# Patient Record
Sex: FEMALE | Race: BLACK OR AFRICAN AMERICAN | NOT HISPANIC OR LATINO | Employment: UNEMPLOYED | URBAN - METROPOLITAN AREA
[De-identification: names, ages, dates, MRNs, and addresses within clinical notes are randomized per-mention and may not be internally consistent; named-entity substitution may affect disease eponyms.]

---

## 2019-10-07 PROBLEM — R30.0 DYSURIA: Status: ACTIVE | Noted: 2019-10-07

## 2021-07-15 PROBLEM — R30.0 DYSURIA: Status: RESOLVED | Noted: 2019-10-07 | Resolved: 2021-07-15

## 2022-01-14 DIAGNOSIS — U07.1 COVID-19 VIRUS DETECTED: ICD-10-CM

## 2022-08-30 ENCOUNTER — TELEPHONE (OUTPATIENT)
Dept: ORTHOPEDICS | Facility: CLINIC | Age: 37
End: 2022-08-30
Payer: MEDICAID

## 2022-08-30 DIAGNOSIS — M25.562 LEFT KNEE PAIN, UNSPECIFIED CHRONICITY: Primary | ICD-10-CM

## 2022-08-30 DIAGNOSIS — M25.562 PAIN IN BOTH KNEES, UNSPECIFIED CHRONICITY: Primary | ICD-10-CM

## 2022-08-30 DIAGNOSIS — M25.561 PAIN IN BOTH KNEES, UNSPECIFIED CHRONICITY: Primary | ICD-10-CM

## 2022-08-30 NOTE — TELEPHONE ENCOUNTER
LVM to let patient know that xray order would be changed to add both knees and appointment note modified to be seen for both knees.  ----- Message from Helga Cortés sent at 8/30/2022  3:01 PM CDT -----  Type:  Patient Returning Call    Who Called:pt  Who Left Message for Patient:Jie  Does the patient know what this is regarding?:same problem on both of her knees  Would the patient rather a call back or a response via [facility]? call  Best Call Back Number:[phone]  Additional Information: .

## 2022-09-02 ENCOUNTER — OFFICE VISIT (OUTPATIENT)
Dept: ORTHOPEDICS | Facility: CLINIC | Age: 37
End: 2022-09-02
Payer: MEDICAID

## 2022-09-02 ENCOUNTER — HOSPITAL ENCOUNTER (OUTPATIENT)
Dept: RADIOLOGY | Facility: HOSPITAL | Age: 37
Discharge: HOME | End: 2022-09-02
Attending: STUDENT IN AN ORGANIZED HEALTH CARE EDUCATION/TRAINING PROGRAM
Payer: MEDICAID

## 2022-09-02 ENCOUNTER — TELEPHONE (OUTPATIENT)
Dept: ORTHOPEDICS | Facility: CLINIC | Age: 37
End: 2022-09-02

## 2022-09-02 VITALS — WEIGHT: 212 LBS | HEIGHT: 64 IN | BODY MASS INDEX: 36.19 KG/M2

## 2022-09-02 DIAGNOSIS — M25.562 PAIN IN BOTH KNEES, UNSPECIFIED CHRONICITY: ICD-10-CM

## 2022-09-02 DIAGNOSIS — M25.561 PAIN IN BOTH KNEES, UNSPECIFIED CHRONICITY: ICD-10-CM

## 2022-09-02 DIAGNOSIS — M22.2X1 PATELLOFEMORAL PAIN SYNDROME OF BOTH KNEES: Primary | ICD-10-CM

## 2022-09-02 DIAGNOSIS — M22.2X2 PATELLOFEMORAL PAIN SYNDROME OF BOTH KNEES: Primary | ICD-10-CM

## 2022-09-02 DIAGNOSIS — M79.7 FIBROMYALGIA: ICD-10-CM

## 2022-09-02 DIAGNOSIS — M25.562 ACUTE PAIN OF LEFT KNEE: ICD-10-CM

## 2022-09-02 PROCEDURE — 73564 X-RAY EXAM KNEE 4 OR MORE: CPT | Mod: 26,LT,, | Performed by: RADIOLOGY

## 2022-09-02 PROCEDURE — 73564 X-RAY EXAM KNEE 4 OR MORE: CPT | Mod: 26,RT,, | Performed by: RADIOLOGY

## 2022-09-02 PROCEDURE — 3008F PR BODY MASS INDEX (BMI) DOCUMENTED: ICD-10-PCS | Mod: CPTII,,, | Performed by: STUDENT IN AN ORGANIZED HEALTH CARE EDUCATION/TRAINING PROGRAM

## 2022-09-02 PROCEDURE — 99215 OFFICE O/P EST HI 40 MIN: CPT | Mod: PBBFAC | Performed by: STUDENT IN AN ORGANIZED HEALTH CARE EDUCATION/TRAINING PROGRAM

## 2022-09-02 PROCEDURE — 73564 PR  X-RAY KNEE 4+ VIEW: ICD-10-PCS | Mod: 26,LT,, | Performed by: RADIOLOGY

## 2022-09-02 PROCEDURE — 97110 THERAPEUTIC EXERCISES: CPT | Mod: PBBFAC

## 2022-09-02 PROCEDURE — 1160F RVW MEDS BY RX/DR IN RCRD: CPT | Mod: CPTII,,, | Performed by: STUDENT IN AN ORGANIZED HEALTH CARE EDUCATION/TRAINING PROGRAM

## 2022-09-02 PROCEDURE — 99203 PR OFFICE/OUTPT VISIT, NEW, LEVL III, 30-44 MIN: ICD-10-PCS | Mod: S$PBB,,, | Performed by: STUDENT IN AN ORGANIZED HEALTH CARE EDUCATION/TRAINING PROGRAM

## 2022-09-02 PROCEDURE — 1159F MED LIST DOCD IN RCRD: CPT | Mod: CPTII,,, | Performed by: STUDENT IN AN ORGANIZED HEALTH CARE EDUCATION/TRAINING PROGRAM

## 2022-09-02 PROCEDURE — 99999 PR PBB SHADOW E&M-EST. PATIENT-LVL V: CPT | Mod: PBBFAC,,, | Performed by: STUDENT IN AN ORGANIZED HEALTH CARE EDUCATION/TRAINING PROGRAM

## 2022-09-02 PROCEDURE — 99203 OFFICE O/P NEW LOW 30 MIN: CPT | Mod: S$PBB,,, | Performed by: STUDENT IN AN ORGANIZED HEALTH CARE EDUCATION/TRAINING PROGRAM

## 2022-09-02 PROCEDURE — 97110 THERAPEUTIC EXERCISES: CPT | Mod: S$PBB,GP,, | Performed by: STUDENT IN AN ORGANIZED HEALTH CARE EDUCATION/TRAINING PROGRAM

## 2022-09-02 PROCEDURE — 73564 X-RAY EXAM KNEE 4 OR MORE: CPT | Mod: TC,50

## 2022-09-02 PROCEDURE — 97110 PR THERAPEUTIC EXERCISES: ICD-10-PCS | Mod: S$PBB,GP,, | Performed by: STUDENT IN AN ORGANIZED HEALTH CARE EDUCATION/TRAINING PROGRAM

## 2022-09-02 PROCEDURE — 1160F PR REVIEW ALL MEDS BY PRESCRIBER/CLIN PHARMACIST DOCUMENTED: ICD-10-PCS | Mod: CPTII,,, | Performed by: STUDENT IN AN ORGANIZED HEALTH CARE EDUCATION/TRAINING PROGRAM

## 2022-09-02 PROCEDURE — 3008F BODY MASS INDEX DOCD: CPT | Mod: CPTII,,, | Performed by: STUDENT IN AN ORGANIZED HEALTH CARE EDUCATION/TRAINING PROGRAM

## 2022-09-02 PROCEDURE — 1159F PR MEDICATION LIST DOCUMENTED IN MEDICAL RECORD: ICD-10-PCS | Mod: CPTII,,, | Performed by: STUDENT IN AN ORGANIZED HEALTH CARE EDUCATION/TRAINING PROGRAM

## 2022-09-02 PROCEDURE — 99999 PR PBB SHADOW E&M-EST. PATIENT-LVL V: ICD-10-PCS | Mod: PBBFAC,,, | Performed by: STUDENT IN AN ORGANIZED HEALTH CARE EDUCATION/TRAINING PROGRAM

## 2022-09-02 RX ORDER — METHYLPREDNISOLONE 4 MG/1
TABLET ORAL
Qty: 21 EACH | Refills: 0 | Status: SHIPPED | OUTPATIENT
Start: 2022-09-02 | End: 2022-11-18

## 2022-09-02 NOTE — TELEPHONE ENCOUNTER
Patient wanted to check to make sure that follow up was scheduled for the correct amount of time between visits.  Checked doctor's note and patient is to follow up in 8 weeks so appointment is scheduled correctly.  Patient verbalized understanding.   ----- Message from Talia Garcia sent at 9/2/2022  2:17 PM CDT -----  Contact: pt  The pt request a call concerning her 10/28 appt, can be reached at [phone]//thxMW

## 2022-09-02 NOTE — PATIENT INSTRUCTIONS
Assessment:  Mali Mckinley is a 37 y.o. female   Chief Complaint   Patient presents with    Left Knee - Pain    Right Knee - Pain       Encounter Diagnosis   Name Primary?    Acute pain of left knee         Plan:  Ordered Medrol Dose pack  Apply topical diclofenac (Voltaren) up to 4 times a day to the affected area.  It can be bought over the counter at any local pharmacy.                  Follow-up:  8 weeks or sooner if there are any problems between now and then.    Thank you for choosing [facility] and Dr. Higinio Lagos for your orthopedic & sports medicine care. It is our goal to provide you with exceptional care that will help keep you healthy, active, and get you back in the game.    Please do not hesitate to reach out to us via email, phone, or MyChart with any questions, concerns, or feedback.    If you felt that you received exemplary care today, please consider leaving us feedback on [employer]ettes at:  [email or web address]    If you are experiencing pain/discomfort ,or have questions after 5pm and would like to be connected to the [facility] on-call team, please call this number and specify which Sports Medicine provider is treating you: [phone]

## 2022-09-02 NOTE — PROGRESS NOTES
"        Patient ID: Mali Mckinley  YOB: 1985  MRN: [id number]    Chief Complaint: Pain of the Left Knee and Pain of the Right Knee      Referred By: ANNELIESE Rascon for Bilateral Knee Pain    History of Present Illness: Mali Mckinley is a right-hand dominant 37 y.o. female who presents today with bilateral knee pain with the left being greater than the right. Patient rates her pain today at a 7/10.  She states that her right knee has been hurting since July of 2021 and her left knee started to give her issues in June of 2022.  Patient states that she has not had any PHOEBE.  She does report a previous surgery in 2019, by Dr. Kalin Choe, to the left knee for meniscus issue, and states that she had no issues following the surgery.  Patient states that she has pain when walking and that she feels as if something in the front of her knee "shifts down" and causes her pain to where she must stop and move the knee back and forth until it releases so she can walk again.  Patient also reports pain with any movement, going form a standing to a seated position, kneeling on the knee; climbing stairs and getting in and out of her car.  She has tried Meloxicam and physical therapy and states that neither helped alleviate her pain.       The patient is active in none.  Occupation: Unemployed      Past Medical History:   Past Medical History:   Diagnosis Date    Fibromyalgia     GERD (gastroesophageal reflux disease)     Hypertension     Neuropathy      Past Surgical History:   Procedure Laterality Date    ARTHROSCOPY OF KNEE Left 12/03/2019    Repair of Torn Meniscus    CHOLECYSTECTOMY      ROTATOR CUFF REPAIR Right 2006 and 2012    ROTATOR CUFF REPAIR Left 2007     Family History   Problem Relation Age of Onset    Hypertension Mother     Hypertension Paternal Grandmother     Hyperlipidemia Paternal Grandmother      Social History     Socioeconomic History    Marital status: Single   Occupational " History    Occupation: Disabled   Tobacco Use    Smoking status: Never    Smokeless tobacco: Never   Substance and Sexual Activity    Alcohol use: Not Currently    Drug use: Never    Sexual activity: Not Currently     Medication List with Changes/Refills   New Medications    METHYLPREDNISOLONE (MEDROL DOSEPACK) 4 MG TABLET    use as directed   Current Medications    AMITRIPTYLINE (ELAVIL) 25 MG TABLET    Take 50 mg by mouth every evening.     AMITRIPTYLINE (ELAVIL) 25 MG TABLET    Take 2 tablets by mouth nightly.    CHLORTHALIDONE (HYGROTEN) 25 MG TAB    Take 1 tablet (25 mg total) by mouth once daily.    DUPILUMAB (DUPIXENT PEN) 300 MG/2 ML PNIJ        GABAPENTIN (NEURONTIN) 300 MG CAPSULE    Take 1 capsule (300 mg total) by mouth 3 (three) times daily.    HYDROXYCHLOROQUINE (PLAQUENIL) 200 MG TABLET    Take 1 tablet (200 mg total) by mouth 2 (two) times daily.    HYDROXYCHLOROQUINE (PLAQUENIL) 200 MG TABLET    Take 1 tablet by mouth 2 (two) times daily.    HYDROXYZINE HCL (ATARAX) 25 MG TABLET    Take 1 tablet (25 mg total) by mouth 2 (two) times a day.    KETOCONAZOLE (NIZORAL) 2 % CREAM    Apply topically once daily.    MAGNESIUM GLUCONATE 27.5 MG MAGNE- SIUM (500 MG) TAB    Take 500 mg by mouth once daily.    MELOXICAM (MOBIC) 7.5 MG TABLET    TAKE 1 TABLET BY MOUTH TWO TIMES DAILY AS NEEDED    METHOCARBAMOL (ROBAXIN) 500 MG TAB    TAKE 1 TABLET BY MOUTH 2 TO 3 TIMES A DAY AS NEEDED FOR SPASMS    NALTREXONE CAPSULE    Take 4.5 mg by mouth once daily.    NORGESTIMATE-ETHINYL ESTRADIOL (ORTHO-CYCLEN) 0.25-35 MG-MCG PER TABLET    Take 1 tablet by mouth once daily.    PANTOPRAZOLE (PROTONIX) 40 MG TABLET    Take 1 tablet (40 mg total) by mouth 2 (two) times daily.    POTASSIUM CHLORIDE SA (K-DUR,KLOR-CON) 20 MEQ TABLET    Take 1 tablet (20 mEq total) by mouth 2 (two) times daily.    PROMETHAZINE (PHENERGAN) 25 MG TABLET    Take 1 tablet (25 mg total) by mouth every 4 (four) hours.    PROPRANOLOL (INDERAL LA) 80 MG  24 HR CAPSULE    Take 1 capsule (80 mg total) by mouth once daily.    SALSALATE (DISALCID) 500 MG TAB    Take 500 mg by mouth 2 (two) times daily as needed.    SULINDAC (CLINORIL) 200 MG TAB    Take 200 mg by mouth 2 (two) times daily as needed.    TRIAMCINOLONE ACETONIDE 0.025% (KENALOG) 0.025 % CREAM    Apply topically 2 (two) times daily.    TRIAMCINOLONE ACETONIDE 0.1% (KENALOG) 0.1 % CREAM    2 (two) times daily as needed. Apply to affected area    TURMERIC ROOT EXTRACT 500 MG CAP    Take 500 mg by mouth 2 (two) times daily.     Review of patient's allergies indicates:   Allergen Reactions    Cyclobenzaprine     Cymbalta [duloxetine]     Ibuprofen     Oxycodone     Sulfa (sulfonamide antibiotics)     Toradol [ketorolac]     Tramadol        Physical Exam:   Body mass index is 36.39 kg/m².    GENERAL: Well appearing, in no acute distress.  HEAD: Normocephalic and atraumatic.  ENT: External ears and nose grossly normal.  EYES: EOMI bilaterally  PULMONARY: Respirations are grossly even and non-labored.  NEURO: Awake, alert, and oriented x 3.  SKIN: No obvious rashes appreciated.  PSYCH: Mood & affect are appropriate.    Detailed MSK exam:     Left knee exam:   -ROM: extension 0, flexion 110, pain with terminal flexion  -TTP: none  -effusion: trace  -Yakelin test negative  -stable to varus and valgus stress tests  -Lachman test negative, anterior drawer test negative, posterior drawer test negative  -baker's cyst present    Right knee exam:   -ROM: extension 0, flexion 110, pain with terminal flexion  -TTP: none  -effusion: trace  -Yakelin test negative  -stable to varus and valgus stress tests  -Lachman test negative, anterior drawer test negative, posterior drawer test negative  -baker's cyst present      Imaging:  X-ray Knee Ortho Bilateral with Flexion  Narrative: EXAMINATION:  XR KNEE ORTHO BILAT WITH FLEXION    CLINICAL HISTORY:  Pain in right knee    TECHNIQUE:  AP standing of both knees, PA flexion  standing views of both knees, and Merchant views of both knees were performed.  Lateral views of both knees were also performed.    COMPARISON  None    FINDINGS:  The joint spaces of all 3 compartments of both knees appear to be well maintained.  No joint effusions are suggested.  No acute fracture or dislocation.  Impression: 1.  As above    Electronically signed by: Neymar Marie DO  Date:    09/02/2022  Time:    08:24        Relevant imaging results were reviewed and interpreted by me and per my read shows no acute abnormalities.  This was discussed with the patient and / or family today.     Assessment:  Mali Mckinley is a 37 y.o. female presenting with bilateral knee pain L > R. History, physical and radiographs are consistent with a likely diagnosis of PFPS and inflammation. Trial medrol dose pack and home exercises. Continue conservative management for pain. Follow up in 2 months, if not improving, consider advanced imaging for the right knee if indicated. All questions answered.      Patellofemoral pain syndrome of both knees  -     methylPREDNISolone (MEDROL DOSEPACK) 4 mg tablet; use as directed  Dispense: 21 each; Refill: 0    Acute pain of left knee  -     Ambulatory referral/consult to Orthopedics    Fibromyalgia  -     methylPREDNISolone (MEDROL DOSEPACK) 4 mg tablet; use as directed  Dispense: 21 each; Refill: 0    At least 15 minutes were spent instructing the patient in therapeutic exercises.  All questions were answered and exercises were provided in the After Visit Summary.  This service was performed by Jie Argueta, Sports Medicine Assistant under direction of Higinio Lagos MD.  CPT 37194-UO.     A copy of today's visit note has been sent to the referring provider.     Electronically signed:  Higinio Lagos MD, MPH  09/02/2022  8:33 AM

## 2022-09-15 ENCOUNTER — TELEPHONE (OUTPATIENT)
Dept: ORTHOPEDICS | Facility: CLINIC | Age: 37
End: 2022-09-15
Payer: MEDICAID

## 2022-09-15 NOTE — TELEPHONE ENCOUNTER
Contacted patient and asked if she was performing her HEP and icing the knee.  She stated that she was.  Patient states that 2 days after finishing her steroid pack the pain in her knee returned and would like to know what the next step is.  Told patient that doctor is out of office this afternoon but I would consult with him first thing in the morning and get back in touch with patient tomorrow.  Patient verbalized understanding.   ----- Message from Chela Hope sent at 9/15/2022  3:08 PM CDT -----  Contact: Self  ..Type:  Needs Medical Advice    Who Called: .Mali Mckinley  Symptoms (please be specific): swelling of knee /pain  How long has patient had these symptoms:    Pharmacy name and phone #:  .  [facility] - [facility] [address]  [address] [address]  Phone: [phone] Fax: [phone]  Would the patient rather a call back or a response via [facility]? Call back   Best Call Back Number: .[phone] (home)   Additional Information: pt states she has finished medication that was prescribed and still having same symptoms as before

## 2022-09-16 ENCOUNTER — TELEPHONE (OUTPATIENT)
Dept: ORTHOPEDICS | Facility: CLINIC | Age: 37
End: 2022-09-16
Payer: MEDICAID

## 2022-09-16 NOTE — TELEPHONE ENCOUNTER
Contacted patient and explained that the next steps in helping with her issues would be to receive a CSI & / or formal physical therapy.  Explained that the condition that she has is best treated with long term formal physical therapy and that if she was interested we could provide a referral for her.  She stated that she would call us back if she was interested in scheduling an earlier appointment for injection and to discuss formal PT.  ----- Message from Obdulia Kelly sent at 9/16/2022 11:50 AM CDT -----  Type:  Patient Returning Call    Who Called:pt  Who Left Message for Patient:nurse  Does the patient know what this is regarding?:yes  Would the patient rather a call back or a response via [facility]? call  Best Call Back Number:[phone]  Additional Information:

## 2022-09-19 ENCOUNTER — TELEPHONE (OUTPATIENT)
Dept: ORTHOPEDICS | Facility: CLINIC | Age: 37
End: 2022-09-19
Payer: MEDICAID

## 2022-09-19 NOTE — TELEPHONE ENCOUNTER
Returned patient's phone call.  Patient wanted to proceed with scheduling appointment to discuss and possibly receive CSI.  Scheduled patient for October 4 at 8:30.  Patient verbalized understanding of appt time and date.   ----- Message from Linnea Mesa sent at 9/19/2022 10:47 AM CDT -----  Contact: Mali Yao is calling to speak to the nurse regarding questions and concerns. Please give patient a call back at .[phone].

## 2022-10-04 ENCOUNTER — OFFICE VISIT (OUTPATIENT)
Dept: ORTHOPEDICS | Facility: CLINIC | Age: 37
End: 2022-10-04
Payer: MEDICAID

## 2022-10-04 ENCOUNTER — TELEPHONE (OUTPATIENT)
Dept: ORTHOPEDICS | Facility: CLINIC | Age: 37
End: 2022-10-04

## 2022-10-04 VITALS — WEIGHT: 212 LBS | HEIGHT: 64 IN | BODY MASS INDEX: 36.19 KG/M2

## 2022-10-04 DIAGNOSIS — M25.562 LEFT KNEE PAIN, UNSPECIFIED CHRONICITY: ICD-10-CM

## 2022-10-04 DIAGNOSIS — M22.2X2 PATELLOFEMORAL PAIN SYNDROME OF BOTH KNEES: Primary | ICD-10-CM

## 2022-10-04 DIAGNOSIS — M25.561 PAIN IN BOTH KNEES, UNSPECIFIED CHRONICITY: ICD-10-CM

## 2022-10-04 DIAGNOSIS — M22.2X1 PATELLOFEMORAL PAIN SYNDROME OF BOTH KNEES: Primary | ICD-10-CM

## 2022-10-04 DIAGNOSIS — M25.562 PAIN IN BOTH KNEES, UNSPECIFIED CHRONICITY: ICD-10-CM

## 2022-10-04 PROCEDURE — 3008F BODY MASS INDEX DOCD: CPT | Mod: CPTII,,, | Performed by: STUDENT IN AN ORGANIZED HEALTH CARE EDUCATION/TRAINING PROGRAM

## 2022-10-04 PROCEDURE — 20610 LARGE JOINT ASPIRATION/INJECTION: BILATERAL KNEE: ICD-10-PCS | Mod: 50,S$PBB,, | Performed by: STUDENT IN AN ORGANIZED HEALTH CARE EDUCATION/TRAINING PROGRAM

## 2022-10-04 PROCEDURE — 99214 PR OFFICE/OUTPT VISIT, EST, LEVL IV, 30-39 MIN: ICD-10-PCS | Mod: S$PBB,25,, | Performed by: STUDENT IN AN ORGANIZED HEALTH CARE EDUCATION/TRAINING PROGRAM

## 2022-10-04 PROCEDURE — 99214 OFFICE O/P EST MOD 30 MIN: CPT | Mod: S$PBB,25,, | Performed by: STUDENT IN AN ORGANIZED HEALTH CARE EDUCATION/TRAINING PROGRAM

## 2022-10-04 PROCEDURE — 99999 PR PBB SHADOW E&M-EST. PATIENT-LVL V: ICD-10-PCS | Mod: PBBFAC,,, | Performed by: STUDENT IN AN ORGANIZED HEALTH CARE EDUCATION/TRAINING PROGRAM

## 2022-10-04 PROCEDURE — 1160F RVW MEDS BY RX/DR IN RCRD: CPT | Mod: CPTII,,, | Performed by: STUDENT IN AN ORGANIZED HEALTH CARE EDUCATION/TRAINING PROGRAM

## 2022-10-04 PROCEDURE — 1160F PR REVIEW ALL MEDS BY PRESCRIBER/CLIN PHARMACIST DOCUMENTED: ICD-10-PCS | Mod: CPTII,,, | Performed by: STUDENT IN AN ORGANIZED HEALTH CARE EDUCATION/TRAINING PROGRAM

## 2022-10-04 PROCEDURE — 99215 OFFICE O/P EST HI 40 MIN: CPT | Mod: PBBFAC | Performed by: STUDENT IN AN ORGANIZED HEALTH CARE EDUCATION/TRAINING PROGRAM

## 2022-10-04 PROCEDURE — 20610 DRAIN/INJ JOINT/BURSA W/O US: CPT | Mod: 50,PBBFAC | Performed by: STUDENT IN AN ORGANIZED HEALTH CARE EDUCATION/TRAINING PROGRAM

## 2022-10-04 PROCEDURE — 1159F MED LIST DOCD IN RCRD: CPT | Mod: CPTII,,, | Performed by: STUDENT IN AN ORGANIZED HEALTH CARE EDUCATION/TRAINING PROGRAM

## 2022-10-04 PROCEDURE — 99999 PR PBB SHADOW E&M-EST. PATIENT-LVL V: CPT | Mod: PBBFAC,,, | Performed by: STUDENT IN AN ORGANIZED HEALTH CARE EDUCATION/TRAINING PROGRAM

## 2022-10-04 PROCEDURE — 1159F PR MEDICATION LIST DOCUMENTED IN MEDICAL RECORD: ICD-10-PCS | Mod: CPTII,,, | Performed by: STUDENT IN AN ORGANIZED HEALTH CARE EDUCATION/TRAINING PROGRAM

## 2022-10-04 PROCEDURE — 3008F PR BODY MASS INDEX (BMI) DOCUMENTED: ICD-10-PCS | Mod: CPTII,,, | Performed by: STUDENT IN AN ORGANIZED HEALTH CARE EDUCATION/TRAINING PROGRAM

## 2022-10-04 RX ORDER — TRIAMCINOLONE ACETONIDE 40 MG/ML
40 INJECTION, SUSPENSION INTRA-ARTICULAR; INTRAMUSCULAR
Status: DISCONTINUED | OUTPATIENT
Start: 2022-10-04 | End: 2022-10-04 | Stop reason: HOSPADM

## 2022-10-04 RX ADMIN — TRIAMCINOLONE ACETONIDE 40 MG: 200 INJECTION, SUSPENSION INTRA-ARTICULAR; INTRAMUSCULAR at 08:10

## 2022-10-04 NOTE — PROCEDURES
Large Joint Aspiration/Injection: bilateral knee    Date/Time: 10/4/2022 8:30 AM  Performed by: Higinio Lagos MD  Authorized by: Higinio Lagos MD     Consent Done?:  Yes (Verbal)  Indications:  Pain  Site marked: the procedure site was marked    Timeout: prior to procedure the correct patient, procedure, and site was verified    Prep: patient was prepped and draped in usual sterile fashion    Local anesthetic:  Bupivacaine 0.5% without epinephrine and lidocaine 1% without epinephrine    Details:  Needle Size:  22 G  Approach:  Anterolateral  Location:  Knee  Laterality:  Bilateral  Site:  Bilateral knee  Medications (Right):  40 mg triamcinolone acetonide 40 mg/mL  Medications (Left):  40 mg triamcinolone acetonide 40 mg/mL  Patient tolerance:  Patient tolerated the procedure well with no immediate complications

## 2022-10-04 NOTE — PROGRESS NOTES
Patient ID: Mali Mckinley  YOB: 1985  MRN: [id number]    Chief Complaint: Pain of the Left Knee and Pain of the Right Knee      History of Present Illness: Mali Mckinley is a right-hand dominant 37 y.o. female who presents today with bilateral knee pain and is interested in cortisone injections.  Patient states that her pain is an 8/10 today and that she hasn't received any relief from the Medrol dose pack.  She has been performing her HEP and using the Voltaren gel 3x / day without relief.  She states that the pain is mainly in the anterior knee region and that the left knee is greater than the right.       The patient is active in none.  Occupation: Unemployed      Past Medical History:   Past Medical History:   Diagnosis Date    Fibromyalgia     GERD (gastroesophageal reflux disease)     Hypertension     Neuropathy      Past Surgical History:   Procedure Laterality Date    ARTHROSCOPY OF KNEE Left 12/03/2019    Repair of Torn Meniscus    CHOLECYSTECTOMY      ROTATOR CUFF REPAIR Right 2006 and 2012    ROTATOR CUFF REPAIR Left 2007     Family History   Problem Relation Age of Onset    Hypertension Mother     Hypertension Paternal Grandmother     Hyperlipidemia Paternal Grandmother      Social History     Socioeconomic History    Marital status: Single   Occupational History    Occupation: Disabled   Tobacco Use    Smoking status: Never    Smokeless tobacco: Never   Substance and Sexual Activity    Alcohol use: Not Currently    Drug use: Never    Sexual activity: Not Currently     Medication List with Changes/Refills   Current Medications    AMITRIPTYLINE (ELAVIL) 25 MG TABLET    Take 50 mg by mouth every evening.     AMITRIPTYLINE (ELAVIL) 25 MG TABLET    Take 2 tablets by mouth nightly.    CHLORTHALIDONE (HYGROTEN) 25 MG TAB    Take 1 tablet (25 mg total) by mouth once daily.    DUPILUMAB (DUPIXENT PEN) 300 MG/2 ML PNIJ        GABAPENTIN (NEURONTIN) 300 MG CAPSULE    Take 1 capsule  (300 mg total) by mouth 3 (three) times daily.    HYDROXYCHLOROQUINE (PLAQUENIL) 200 MG TABLET    Take 1 tablet (200 mg total) by mouth 2 (two) times daily.    HYDROXYCHLOROQUINE (PLAQUENIL) 200 MG TABLET    Take 1 tablet by mouth 2 (two) times daily.    HYDROXYZINE HCL (ATARAX) 25 MG TABLET    Take 1 tablet (25 mg total) by mouth 2 (two) times a day.    KETOCONAZOLE (NIZORAL) 2 % CREAM    Apply topically once daily.    MAGNESIUM GLUCONATE 27.5 MG MAGNE- SIUM (500 MG) TAB    Take 500 mg by mouth once daily.    MELOXICAM (MOBIC) 7.5 MG TABLET    TAKE 1 TABLET BY MOUTH TWO TIMES DAILY AS NEEDED    METHOCARBAMOL (ROBAXIN) 500 MG TAB    TAKE 1 TABLET BY MOUTH 2 TO 3 TIMES A DAY AS NEEDED FOR SPASMS    METHYLPREDNISOLONE (MEDROL DOSEPACK) 4 MG TABLET    use as directed    NALTREXONE CAPSULE    Take 4.5 mg by mouth once daily.    NORGESTIMATE-ETHINYL ESTRADIOL (ORTHO-CYCLEN) 0.25-35 MG-MCG PER TABLET    Take 1 tablet by mouth once daily.    PANTOPRAZOLE (PROTONIX) 40 MG TABLET    Take 1 tablet (40 mg total) by mouth 2 (two) times daily.    POTASSIUM CHLORIDE SA (K-DUR,KLOR-CON) 20 MEQ TABLET    Take 1 tablet (20 mEq total) by mouth 2 (two) times daily.    PROMETHAZINE (PHENERGAN) 25 MG TABLET    Take 1 tablet (25 mg total) by mouth every 4 (four) hours.    PROPRANOLOL (INDERAL LA) 80 MG 24 HR CAPSULE    Take 1 capsule (80 mg total) by mouth once daily.    SALSALATE (DISALCID) 500 MG TAB    Take 500 mg by mouth 2 (two) times daily as needed.    SULINDAC (CLINORIL) 200 MG TAB    Take 200 mg by mouth 2 (two) times daily as needed.    TRIAMCINOLONE ACETONIDE 0.025% (KENALOG) 0.025 % CREAM    Apply topically 2 (two) times daily.    TRIAMCINOLONE ACETONIDE 0.1% (KENALOG) 0.1 % CREAM    2 (two) times daily as needed. Apply to affected area    TURMERIC ROOT EXTRACT 500 MG CAP    Take 500 mg by mouth 2 (two) times daily.     Review of patient's allergies indicates:   Allergen Reactions    Cyclobenzaprine     Cymbalta [duloxetine]      Ibuprofen     Oxycodone     Sulfa (sulfonamide antibiotics)     Toradol [ketorolac]     Tramadol        Physical Exam:   Body mass index is 36.39 kg/m².    GENERAL: Well appearing, in no acute distress.  HEAD: Normocephalic and atraumatic.  ENT: External ears and nose grossly normal.  EYES: EOMI bilaterally  PULMONARY: Respirations are grossly even and non-labored.  NEURO: Awake, alert, and oriented x 3.  SKIN: No obvious rashes appreciated.  PSYCH: Mood & affect are appropriate.    Detailed MSK exam:     Right knee exam:   -ROM: extension 0, flexion 120  -TTP: anterior  -effusion: trace  -Patellar apprehension negative  -Yakelin test negative  -stable to varus and valgus stress tests  -Lachman test negative, anterior drawer test negative, posterior drawer test negative    Left knee exam:   -ROM: extension 0, flexion 120  -TTP: anterior  -effusion: trace  -Patellar apprehension negative  -Yakelin test negative  -stable to varus and valgus stress tests  -Lachman test negative, anterior drawer test negative, posterior drawer test negative      Imaging:  X-ray Knee Ortho Bilateral with Flexion  Narrative: EXAMINATION:  XR KNEE ORTHO BILAT WITH FLEXION    CLINICAL HISTORY:  Pain in right knee    TECHNIQUE:  AP standing of both knees, PA flexion standing views of both knees, and Merchant views of both knees were performed.  Lateral views of both knees were also performed.    COMPARISON  None    FINDINGS:  The joint spaces of all 3 compartments of both knees appear to be well maintained.  No joint effusions are suggested.  No acute fracture or dislocation.  Impression: 1.  As above    Electronically signed by: Neymar Marie DO  Date:    09/02/2022  Time:    08:24        Relevant imaging results were reviewed and interpreted by me and per my read shows no acute abnormalities.  This was discussed with the patient and / or family today.     Assessment:  Mali Mckinley is a 37 y.o. female following up for bilateral  knee pain  Plan: Steroid injection given today (see separate procedure note for details). PT referral.   Follow up in 3 months. All questions answered.     Patellofemoral pain syndrome of both knees  -     Ambulatory referral/consult to Physical/Occupational Therapy; Future; Expected date: 10/11/2022  -     Large Joint Aspiration/Injection: bilateral knee    Left knee pain, unspecified chronicity  -     Ambulatory referral/consult to Physical/Occupational Therapy; Future; Expected date: 10/11/2022    Pain in both knees, unspecified chronicity  -     Ambulatory referral/consult to Physical/Occupational Therapy; Future; Expected date: 10/11/2022       Electronically signed:  Higinio Lagos MD, MPH  10/04/2022  10:27 AM

## 2022-10-04 NOTE — PATIENT INSTRUCTIONS
Assessment:  Mali Mckinley is a 37 y.o. female   Chief Complaint   Patient presents with    Left Knee - Pain    Right Knee - Pain       Encounter Diagnoses   Name Primary?    Patellofemoral pain syndrome of both knees Yes    Left knee pain, unspecified chronicity     Pain in both knees, unspecified chronicity         Plan:  Referral to physical therapy at [facility]  Bilateral cortisone injections   We discussed the proper protocols after the injection such as no submerging pools, baths tubs, or hot tubs for 24 hr.  Showering is okay today.  We also discussed that blood sugars can be elevated after an injection and asked patient to properly checked her sugars over the next few days and contact their PCP if there are any concerns.  We discussed red flags such as fevers, chills, red, warm, tender joint at the area of injection to please seek medical care immediately.    Follow up in 3 months    Follow-up: 3 months or sooner if there are any problems between now and then.    Thank you for choosing [facility] and Dr. Higinio Lagos for your orthopedic & sports medicine care. It is our goal to provide you with exceptional care that will help keep you healthy, active, and get you back in the game.    Please do not hesitate to reach out to us via email, phone, or MyChart with any questions, concerns, or feedback.    If you felt that you received exemplary care today, please consider leaving us feedback on Mobilis[employer] at:  [email or web address]    If you are experiencing pain/discomfort ,or have questions after 5pm and would like to be connected to the [facility] on-call team, please call this number and specify which Sports Medicine provider is treating you: [phone]

## 2022-10-25 ENCOUNTER — TELEPHONE (OUTPATIENT)
Dept: ORTHOPEDICS | Facility: CLINIC | Age: 37
End: 2022-10-25
Payer: MEDICAID

## 2022-10-25 NOTE — TELEPHONE ENCOUNTER
Faxed and received email confirmation of receipt of PT Initial Eval to [facility]

## 2022-11-10 ENCOUNTER — TELEPHONE (OUTPATIENT)
Dept: ORTHOPEDICS | Facility: CLINIC | Age: 37
End: 2022-11-10
Payer: MEDICAID

## 2022-11-10 NOTE — TELEPHONE ENCOUNTER
Contacted patient.  States that she has attended 2 visits of PT and that her pain and popping in knee has increased.  She is no longer able to take IBU or Mobic as of Friday according to rheumatologist orders due to new medication of salcitate and would like pain med called in.  Explained that it is not the practice of orthopedics to call in pain medication and that would suggest that patient let her physical therapist know that she is having increased pain and popping.  Explained that the PT can modify the exercises and do modalities that can help alleviate pain but that the therapy is needed to help strengthen the knee.  Told patient that the message would be forwarded to provider and that staff would get back with her tomorrow with any added suggestions from provider.     ----- Message from Ayesha Reardon sent at 11/10/2022 11:27 AM CST -----  Contact: Mali  Patient stated that she would like a call back, stated she is having pains in both knees. Please call her back

## 2022-11-14 ENCOUNTER — TELEPHONE (OUTPATIENT)
Dept: ORTHOPEDICS | Facility: CLINIC | Age: 37
End: 2022-11-14
Payer: MEDICAID

## 2022-11-14 NOTE — TELEPHONE ENCOUNTER
Spoke with patient and relayed message from provider of the following:    Continue ice 20 mins on, 20 mins off as needed. Voltaren gel QID PRN. Can offer getting MRI knee since she is having continued pain.    Patient stated that she has 2 visits with physical therapy this week and that she is going to give physical therapy a few more tries and if it doesn't help with the pain along with ice and Voltaren gel she will let our office know to order MRI.    ----- Message from Prerna Pickens sent at 11/14/2022  7:46 AM CST -----  Pt would like the nurse to call her back regarding her knee problem. Call back number is .[phone]. Thx EL

## 2022-11-21 ENCOUNTER — TELEPHONE (OUTPATIENT)
Dept: ORTHOPEDICS | Facility: CLINIC | Age: 37
End: 2022-11-21
Payer: MEDICAID

## 2022-11-21 NOTE — TELEPHONE ENCOUNTER
----- Message from Obdulia Kelly sent at 11/21/2022  1:14 PM CST -----  Type:  Sooner Apoointment Request    Caller is requesting a sooner appointment.  Caller declined first available appointment listed below.  Caller will not accept being placed on the waitlist and is requesting a message be sent to doctor.  Name of Caller:patient  When is the first available appointment?1/4/23  Symptoms:F/U Bilateral Knee Pain s/p CSI 10/4  Would the patient rather a call back or a response via [facility]? call  Best Call Back Number:[phone]  Additional Information:

## 2022-11-21 NOTE — TELEPHONE ENCOUNTER
Patient asked for a sooner appointment.  Was able to provide patient with Friday, December 9 @ 7:30 am.  Patient verbalized understanding of appt time, date and location.

## 2022-12-07 ENCOUNTER — TELEPHONE (OUTPATIENT)
Dept: ORTHOPEDICS | Facility: CLINIC | Age: 37
End: 2022-12-07
Payer: MEDICAID

## 2022-12-07 NOTE — TELEPHONE ENCOUNTER
Faxed and received email confirmation of receipt for PT progress note and POC to [facility]

## 2022-12-09 ENCOUNTER — OFFICE VISIT (OUTPATIENT)
Dept: ORTHOPEDICS | Facility: CLINIC | Age: 37
End: 2022-12-09
Payer: MEDICAID

## 2022-12-09 VITALS — WEIGHT: 213 LBS | HEIGHT: 64 IN | BODY MASS INDEX: 36.37 KG/M2

## 2022-12-09 DIAGNOSIS — M25.562 PAIN IN BOTH KNEES, UNSPECIFIED CHRONICITY: ICD-10-CM

## 2022-12-09 DIAGNOSIS — M25.561 PAIN IN BOTH KNEES, UNSPECIFIED CHRONICITY: ICD-10-CM

## 2022-12-09 DIAGNOSIS — M22.2X1 PATELLOFEMORAL PAIN SYNDROME OF BOTH KNEES: Primary | ICD-10-CM

## 2022-12-09 DIAGNOSIS — M22.2X2 PATELLOFEMORAL PAIN SYNDROME OF BOTH KNEES: Primary | ICD-10-CM

## 2022-12-09 DIAGNOSIS — M79.7 FIBROMYALGIA: ICD-10-CM

## 2022-12-09 PROCEDURE — 99999 PR PBB SHADOW E&M-EST. PATIENT-LVL V: ICD-10-PCS | Mod: PBBFAC,,, | Performed by: STUDENT IN AN ORGANIZED HEALTH CARE EDUCATION/TRAINING PROGRAM

## 2022-12-09 PROCEDURE — 1159F MED LIST DOCD IN RCRD: CPT | Mod: CPTII,,, | Performed by: STUDENT IN AN ORGANIZED HEALTH CARE EDUCATION/TRAINING PROGRAM

## 2022-12-09 PROCEDURE — 1160F RVW MEDS BY RX/DR IN RCRD: CPT | Mod: CPTII,,, | Performed by: STUDENT IN AN ORGANIZED HEALTH CARE EDUCATION/TRAINING PROGRAM

## 2022-12-09 PROCEDURE — 3008F PR BODY MASS INDEX (BMI) DOCUMENTED: ICD-10-PCS | Mod: CPTII,,, | Performed by: STUDENT IN AN ORGANIZED HEALTH CARE EDUCATION/TRAINING PROGRAM

## 2022-12-09 PROCEDURE — 1159F PR MEDICATION LIST DOCUMENTED IN MEDICAL RECORD: ICD-10-PCS | Mod: CPTII,,, | Performed by: STUDENT IN AN ORGANIZED HEALTH CARE EDUCATION/TRAINING PROGRAM

## 2022-12-09 PROCEDURE — 3008F BODY MASS INDEX DOCD: CPT | Mod: CPTII,,, | Performed by: STUDENT IN AN ORGANIZED HEALTH CARE EDUCATION/TRAINING PROGRAM

## 2022-12-09 PROCEDURE — 99999 PR PBB SHADOW E&M-EST. PATIENT-LVL V: CPT | Mod: PBBFAC,,, | Performed by: STUDENT IN AN ORGANIZED HEALTH CARE EDUCATION/TRAINING PROGRAM

## 2022-12-09 PROCEDURE — 99215 OFFICE O/P EST HI 40 MIN: CPT | Mod: PBBFAC | Performed by: STUDENT IN AN ORGANIZED HEALTH CARE EDUCATION/TRAINING PROGRAM

## 2022-12-09 PROCEDURE — 1160F PR REVIEW ALL MEDS BY PRESCRIBER/CLIN PHARMACIST DOCUMENTED: ICD-10-PCS | Mod: CPTII,,, | Performed by: STUDENT IN AN ORGANIZED HEALTH CARE EDUCATION/TRAINING PROGRAM

## 2022-12-09 PROCEDURE — 99214 OFFICE O/P EST MOD 30 MIN: CPT | Mod: S$PBB,,, | Performed by: STUDENT IN AN ORGANIZED HEALTH CARE EDUCATION/TRAINING PROGRAM

## 2022-12-09 PROCEDURE — 99214 PR OFFICE/OUTPT VISIT, EST, LEVL IV, 30-39 MIN: ICD-10-PCS | Mod: S$PBB,,, | Performed by: STUDENT IN AN ORGANIZED HEALTH CARE EDUCATION/TRAINING PROGRAM

## 2022-12-09 NOTE — PATIENT INSTRUCTIONS
Assessment:  Mali Mckinley is a 37 y.o. female   Chief Complaint   Patient presents with    Right Knee - Pain    Left Knee - Pain       Encounter Diagnoses   Name Primary?    Patellofemoral pain syndrome of both knees Yes    Pain in both knees, unspecified chronicity     Fibromyalgia         Plan:  MRI at [facility] of right knee  Referral to Pain Management offered patient Medicaid Escalation Line to help with scheduling   Follow up after MRI in office for review    Follow-up: Follow up after MRI or sooner if there are any problems between now and then.    Thank you for choosing [facility] and Dr. Higinio Lagos for your orthopedic & sports medicine care. It is our goal to provide you with exceptional care that will help keep you healthy, active, and get you back in the game.    Please do not hesitate to reach out to us via email, phone, or MyChart with any questions, concerns, or feedback.    If you felt that you received exemplary care today, please consider leaving us feedback on [facility] at:  [email or web address]    If you are experiencing pain/discomfort ,or have questions after 5pm and would like to be connected to the [facility] on-call team, please call this number and specify which Sports Medicine provider is treating you: [phone]

## 2022-12-09 NOTE — PROGRESS NOTES
"      Patient ID: Mali Mckinley  YOB: 1985  MRN: [id number]    Chief Complaint: Pain of the Right Knee and Pain of the Left Knee      History of Present Illness: Mali Mckinley is a right-hand dominant 37 y.o. female who presents today with continued bilateral knee pain rating a 7/10.  Patient reports that she has been attending physical therapy at [facility] and that she is not seeing any improvement in her knee pain.  She reports that the pain is still located in the anterior region of her knee and that it is a constant pain.  She received a CSI at her last office visit of 10/04/2022.     The patient is active in none.  Occupation: Unemployed    10/4/2022 Interval History of Present Illness: Mali Mckinley is a right-hand dominant 37 y.o. female who presents today with bilateral knee pain and is interested in cortisone injections.  Patient states that her pain is an 8/10 today and that she hasn't received any relief from the Medrol dose pack.  She has been performing her HEP and using the Voltaren gel 3x / day without relief.  She states that the pain is mainly in the anterior knee region and that the left knee is greater than the right.      9/02/2022 Interval History of Present Illness: Mali Mckinley is a right-hand dominant 37 y.o. female who presents today with bilateral knee pain with the left being greater than the right. Patient rates her pain today at a 7/10.  She states that her right knee has been hurting since July of 2021 and her left knee started to give her issues in June of 2022.  Patient states that she has not had any PHOEBE.  She does report a previous surgery in 2019, by Dr. Kalin Choe, to the left knee for meniscus issue, and states that she had no issues following the surgery.  Patient states that she has pain when walking and that she feels as if something in the front of her knee "shifts down" and causes her pain to where she must stop and move the " knee back and forth until it releases so she can walk again.  Patient also reports pain with any movement, going form a standing to a seated position, kneeling on the knee; climbing stairs and getting in and out of her car.  She has tried Meloxicam and physical therapy and states that neither helped alleviate her pain.     Past Medical History:   Past Medical History:   Diagnosis Date    Fibromyalgia     GERD (gastroesophageal reflux disease)     Hypertension     Neuropathy      Past Surgical History:   Procedure Laterality Date    ARTHROSCOPY OF KNEE Left 12/03/2019    Repair of Torn Meniscus    CHOLECYSTECTOMY      ROTATOR CUFF REPAIR Right 2006 and 2012    ROTATOR CUFF REPAIR Left 2007     Family History   Problem Relation Age of Onset    Hypertension Mother     Hypertension Paternal Grandmother     Hyperlipidemia Paternal Grandmother      Social History     Socioeconomic History    Marital status: Single   Occupational History    Occupation: Disabled   Tobacco Use    Smoking status: Never    Smokeless tobacco: Never   Substance and Sexual Activity    Alcohol use: Not Currently    Drug use: Never    Sexual activity: Not Currently     Medication List with Changes/Refills   Current Medications    AMITRIPTYLINE (ELAVIL) 25 MG TABLET    Take 50 mg by mouth every evening.     CHLORTHALIDONE (HYGROTEN) 25 MG TAB    Take 1 tablet (25 mg total) by mouth once daily.    DUPILUMAB (DUPIXENT PEN) 300 MG/2 ML PNIJ        GABAPENTIN (NEURONTIN) 300 MG CAPSULE    Take 1 capsule (300 mg total) by mouth 3 (three) times daily.    HYDROXYCHLOROQUINE (PLAQUENIL) 200 MG TABLET    Take 1 tablet (200 mg total) by mouth 2 (two) times daily.    HYDROXYZINE HCL (ATARAX) 25 MG TABLET    Take 1 tablet (25 mg total) by mouth 2 (two) times a day.    KETOCONAZOLE (NIZORAL) 2 % CREAM    Apply topically once daily.    MAGNESIUM GLUCONATE 27.5 MG MAGNE- SIUM (500 MG) TAB    Take 500 mg by mouth once daily.    METHOCARBAMOL (ROBAXIN) 500 MG TAB     TAKE 1 TABLET BY MOUTH 2 TO 3 TIMES DAILY AS NEEDED FOR SPASMS    NALTREXONE CAPSULE    Take 4.5 mg by mouth once daily.    NORGESTIMATE-ETHINYL ESTRADIOL (ORTHO-CYCLEN) 0.25-35 MG-MCG PER TABLET    Take 1 tablet by mouth once daily.    PANTOPRAZOLE (PROTONIX) 40 MG TABLET    Take 1 tablet (40 mg total) by mouth 2 (two) times daily.    POTASSIUM CHLORIDE SA (K-DUR,KLOR-CON) 20 MEQ TABLET    TAKE 1 TABLET BY MOUTH 2 TIMES DAILY    PROMETHAZINE (PHENERGAN) 25 MG TABLET    Take 1 tablet (25 mg total) by mouth every 4 (four) hours.    PROPRANOLOL (INDERAL LA) 80 MG 24 HR CAPSULE    Take 1 capsule (80 mg total) by mouth once daily.    SALSALATE (DISALCID) 500 MG TAB    Take 500 mg by mouth 2 (two) times daily as needed.    TRIAMCINOLONE ACETONIDE 0.025% (KENALOG) 0.025 % CREAM    Apply topically 2 (two) times daily.    TRIAMCINOLONE ACETONIDE 0.1% (KENALOG) 0.1 % CREAM    2 (two) times daily as needed. Apply to affected area    TURMERIC ROOT EXTRACT 500 MG CAP    Take 500 mg by mouth 2 (two) times daily.     Review of patient's allergies indicates:   Allergen Reactions    Cyclobenzaprine     Cymbalta [duloxetine]     Ibuprofen     Oxycodone     Sulfa (sulfonamide antibiotics)     Toradol [ketorolac]     Tramadol        Physical Exam:   Body mass index is 36.56 kg/m².    GENERAL: Well appearing, in no acute distress.  HEAD: Normocephalic and atraumatic.  ENT: External ears and nose grossly normal.  EYES: EOMI bilaterally  PULMONARY: Respirations are grossly even and non-labored.  NEURO: Awake, alert, and oriented x 3.  SKIN: No obvious rashes appreciated.  PSYCH: Mood & affect are appropriate.    Detailed MSK exam:     Right knee exam:   -ROM: extension 0, flexion 120  -TTP: medial and lateral joint lines, popliteal fossa  -effusion: trace  -Patellar apprehension negative  -Yakelin test negative  -stable to varus and valgus stress tests  -Lachman test negative, anterior drawer test negative, posterior drawer test  negative    Left knee exam:   -ROM: extension 0, flexion 120  -TTP: medial and lateral joint lines, popliteal fossa  -effusion: trace  -Patellar apprehension negative  -Yakelin test negative  -stable to varus and valgus stress tests  -Lachman test negative, anterior drawer test negative, posterior drawer test negative      Imaging:  X-ray Knee Ortho Bilateral with Flexion  Narrative: EXAMINATION:  XR KNEE ORTHO BILAT WITH FLEXION    CLINICAL HISTORY:  Pain in right knee    TECHNIQUE:  AP standing of both knees, PA flexion standing views of both knees, and Merchant views of both knees were performed.  Lateral views of both knees were also performed.    COMPARISON  None    FINDINGS:  The joint spaces of all 3 compartments of both knees appear to be well maintained.  No joint effusions are suggested.  No acute fracture or dislocation.  Impression: 1.  As above    Electronically signed by: Neymar Marie DO  Date:    09/02/2022  Time:    08:24        Relevant imaging results were reviewed and interpreted by me and per my read shows no acute abnormalities.  This was discussed with the patient and / or family today.     Assessment:  Mali Mckinley is a 37 y.o. female following up for bilateral knee pain. Still having pain despite PT, CSI, voltaren gel, methocarbamol, gabapentin and other medications.   Plan: MRI ordered to further evaluate her right knee since we have exhausted all conservative treatment options so far. Patient is willing to pursue surgical options if necessary. Referral to pain management.   Follow up after MRI is completed to go over results and determine next steps in management. All questions answered.     Patellofemoral pain syndrome of both knees  -     Ambulatory referral/consult to Pain Clinic; Future; Expected date: 12/16/2022  -     MRI Knee Without Contrast Right; Future; Expected date: 12/09/2022    Pain in both knees, unspecified chronicity  -     Ambulatory referral/consult to Pain  Clinic; Future; Expected date: 12/16/2022  -     MRI Knee Without Contrast Right; Future; Expected date: 12/09/2022    Fibromyalgia  -     Ambulatory referral/consult to Pain Clinic; Future; Expected date: 12/16/2022  -     MRI Knee Without Contrast Right; Future; Expected date: 12/09/2022       Electronically signed:  Higinio Lagos MD, MPH  12/09/2022  7:34 AM

## 2022-12-13 ENCOUNTER — TELEPHONE (OUTPATIENT)
Dept: PAIN MEDICINE | Facility: CLINIC | Age: 37
End: 2022-12-13
Payer: MEDICAID

## 2022-12-13 NOTE — TELEPHONE ENCOUNTER
Reached out to patient to schedule appointment . Inform patients that the departments access is limited at this time for any new Medicaid  Patients. I gave pt the Centralized Scheduling number. Which is [phone] .. Pt understood and was compliant.      Servando Murphy  Medical

## 2022-12-13 NOTE — TELEPHONE ENCOUNTER
----- Message from Geovanny Kenyon sent at 12/13/2022 12:12 PM CST -----  Contact: Mali  Type:  Sooner Apoointment Request    Caller is requesting a sooner appointment. Caller declined first available appointment listed below.  Caller will not accept being placed on the waitlist and is requesting a message be sent to doctor.  Name of Caller: Mali  When is the first available appointment? August 2023  Symptoms:   Would the patient rather a call back or a response via [facility]?   [facility] Call Back Number: [phone]  Additional Information: Referral sent by Dr. Higinio Perez

## 2022-12-29 ENCOUNTER — HOSPITAL ENCOUNTER (OUTPATIENT)
Dept: RADIOLOGY | Facility: HOSPITAL | Age: 37
Discharge: HOME | End: 2022-12-29
Attending: STUDENT IN AN ORGANIZED HEALTH CARE EDUCATION/TRAINING PROGRAM
Payer: MEDICAID

## 2022-12-29 DIAGNOSIS — M22.2X1 PATELLOFEMORAL PAIN SYNDROME OF BOTH KNEES: ICD-10-CM

## 2022-12-29 DIAGNOSIS — M25.561 PAIN IN BOTH KNEES, UNSPECIFIED CHRONICITY: ICD-10-CM

## 2022-12-29 DIAGNOSIS — M25.562 PAIN IN BOTH KNEES, UNSPECIFIED CHRONICITY: ICD-10-CM

## 2022-12-29 DIAGNOSIS — M22.2X2 PATELLOFEMORAL PAIN SYNDROME OF BOTH KNEES: ICD-10-CM

## 2022-12-29 DIAGNOSIS — M79.7 FIBROMYALGIA: ICD-10-CM

## 2022-12-29 PROCEDURE — 73721 MRI JNT OF LWR EXTRE W/O DYE: CPT | Mod: TC,RT

## 2023-01-04 ENCOUNTER — OFFICE VISIT (OUTPATIENT)
Dept: ORTHOPEDICS | Facility: CLINIC | Age: 38
End: 2023-01-04
Payer: MEDICAID

## 2023-01-04 VITALS — BODY MASS INDEX: 36.37 KG/M2 | WEIGHT: 213 LBS | HEIGHT: 64 IN

## 2023-01-04 DIAGNOSIS — M23.8X9 CHONDRAL DEFECT OF FEMORAL CONDYLE, UNSPECIFIED LATERALITY: Primary | ICD-10-CM

## 2023-01-04 PROCEDURE — 99214 OFFICE O/P EST MOD 30 MIN: CPT | Mod: S$PBB,25,, | Performed by: STUDENT IN AN ORGANIZED HEALTH CARE EDUCATION/TRAINING PROGRAM

## 2023-01-04 PROCEDURE — 1159F PR MEDICATION LIST DOCUMENTED IN MEDICAL RECORD: ICD-10-PCS | Mod: CPTII,,, | Performed by: STUDENT IN AN ORGANIZED HEALTH CARE EDUCATION/TRAINING PROGRAM

## 2023-01-04 PROCEDURE — 20610 DRAIN/INJ JOINT/BURSA W/O US: CPT | Mod: 50,PBBFAC | Performed by: STUDENT IN AN ORGANIZED HEALTH CARE EDUCATION/TRAINING PROGRAM

## 2023-01-04 PROCEDURE — 3008F BODY MASS INDEX DOCD: CPT | Mod: CPTII,,, | Performed by: STUDENT IN AN ORGANIZED HEALTH CARE EDUCATION/TRAINING PROGRAM

## 2023-01-04 PROCEDURE — 20610 LARGE JOINT ASPIRATION/INJECTION: BILATERAL KNEE: ICD-10-PCS | Mod: 50,S$PBB,, | Performed by: STUDENT IN AN ORGANIZED HEALTH CARE EDUCATION/TRAINING PROGRAM

## 2023-01-04 PROCEDURE — 1160F PR REVIEW ALL MEDS BY PRESCRIBER/CLIN PHARMACIST DOCUMENTED: ICD-10-PCS | Mod: CPTII,,, | Performed by: STUDENT IN AN ORGANIZED HEALTH CARE EDUCATION/TRAINING PROGRAM

## 2023-01-04 PROCEDURE — 99214 OFFICE O/P EST MOD 30 MIN: CPT | Mod: PBBFAC,25 | Performed by: STUDENT IN AN ORGANIZED HEALTH CARE EDUCATION/TRAINING PROGRAM

## 2023-01-04 PROCEDURE — 99999 PR PBB SHADOW E&M-EST. PATIENT-LVL IV: ICD-10-PCS | Mod: PBBFAC,,, | Performed by: STUDENT IN AN ORGANIZED HEALTH CARE EDUCATION/TRAINING PROGRAM

## 2023-01-04 PROCEDURE — 99214 PR OFFICE/OUTPT VISIT, EST, LEVL IV, 30-39 MIN: ICD-10-PCS | Mod: S$PBB,25,, | Performed by: STUDENT IN AN ORGANIZED HEALTH CARE EDUCATION/TRAINING PROGRAM

## 2023-01-04 PROCEDURE — 3008F PR BODY MASS INDEX (BMI) DOCUMENTED: ICD-10-PCS | Mod: CPTII,,, | Performed by: STUDENT IN AN ORGANIZED HEALTH CARE EDUCATION/TRAINING PROGRAM

## 2023-01-04 PROCEDURE — 99999 PR PBB SHADOW E&M-EST. PATIENT-LVL IV: CPT | Mod: PBBFAC,,, | Performed by: STUDENT IN AN ORGANIZED HEALTH CARE EDUCATION/TRAINING PROGRAM

## 2023-01-04 PROCEDURE — 1159F MED LIST DOCD IN RCRD: CPT | Mod: CPTII,,, | Performed by: STUDENT IN AN ORGANIZED HEALTH CARE EDUCATION/TRAINING PROGRAM

## 2023-01-04 PROCEDURE — 1160F RVW MEDS BY RX/DR IN RCRD: CPT | Mod: CPTII,,, | Performed by: STUDENT IN AN ORGANIZED HEALTH CARE EDUCATION/TRAINING PROGRAM

## 2023-01-04 RX ORDER — TRIAMCINOLONE ACETONIDE 40 MG/ML
40 INJECTION, SUSPENSION INTRA-ARTICULAR; INTRAMUSCULAR
Status: DISCONTINUED | OUTPATIENT
Start: 2023-01-04 | End: 2023-01-04 | Stop reason: HOSPADM

## 2023-01-04 RX ADMIN — TRIAMCINOLONE ACETONIDE 40 MG: 200 INJECTION, SUSPENSION INTRA-ARTICULAR; INTRAMUSCULAR at 07:01

## 2023-01-04 NOTE — PROGRESS NOTES
Patient ID: Mali Mckinley  YOB: 1985  MRN: [id number]    Chief Complaint: Pain of the Right Knee and Pain of the Left Knee    History of Present Illness:  Mali Mckinley is a right hand dominant 38 y/o female who presents today for review of MRI results of right knee.  She was last seen in clinic on 12/09/2022.  She states that her knee has been feeling a little bit better and currently rates her pain at a 4/10.  She states that she has completed physical therapy and has an appointment scheduled with a Pain Management doctor, Dr. Reese, in [place], on Friday, January 6, 2023.      12/09/2022 Interval History of Present Illness: Mali Mckinley is a right-hand dominant 37 y.o. female who presents today with continued bilateral knee pain rating a 7/10.  Patient reports that she has been attending physical therapy at [facility] and that she is not seeing any improvement in her knee pain.  She reports that the pain is still located in the anterior region of her knee and that it is a constant pain.  She received a CSI at her last office visit of 10/04/2022.     The patient is active in none.  Occupation: Unemployed    10/4/2022 Interval History of Present Illness: Mali Mckinley is a right-hand dominant 37 y.o. female who presents today with bilateral knee pain and is interested in cortisone injections.  Patient states that her pain is an 8/10 today and that she hasn't received any relief from the Medrol dose pack.  She has been performing her HEP and using the Voltaren gel 3x / day without relief.  She states that the pain is mainly in the anterior knee region and that the left knee is greater than the right.      9/02/2022 Interval History of Present Illness: Mali Mckinley is a right-hand dominant 37 y.o. female who presents today with bilateral knee pain with the left being greater than the right. Patient rates her pain today at a 7/10.  She  "states that her right knee has been hurting since July of 2021 and her left knee started to give her issues in June of 2022.  Patient states that she has not had any PHOEBE.  She does report a previous surgery in 2019, by Dr. Kalin Choe, to the left knee for meniscus issue, and states that she had no issues following the surgery.  Patient states that she has pain when walking and that she feels as if something in the front of her knee "shifts down" and causes her pain to where she must stop and move the knee back and forth until it releases so she can walk again.  Patient also reports pain with any movement, going form a standing to a seated position, kneeling on the knee; climbing stairs and getting in and out of her car.  She has tried Meloxicam and physical therapy and states that neither helped alleviate her pain.     Past Medical History:   Past Medical History:   Diagnosis Date    Fibromyalgia     GERD (gastroesophageal reflux disease)     Hypertension     Neuropathy      Past Surgical History:   Procedure Laterality Date    ARTHROSCOPY OF KNEE Left 12/03/2019    Repair of Torn Meniscus    CHOLECYSTECTOMY      ROTATOR CUFF REPAIR Right 2006 and 2012    ROTATOR CUFF REPAIR Left 2007     Family History   Problem Relation Age of Onset    Hypertension Mother     Hypertension Paternal Grandmother     Hyperlipidemia Paternal Grandmother      Social History     Socioeconomic History    Marital status: Single   Occupational History    Occupation: Disabled   Tobacco Use    Smoking status: Never    Smokeless tobacco: Never   Substance and Sexual Activity    Alcohol use: Not Currently    Drug use: Never    Sexual activity: Not Currently     Medication List with Changes/Refills   Current Medications    AMITRIPTYLINE (ELAVIL) 25 MG TABLET    Take 50 mg by mouth every evening.     CHLORTHALIDONE (HYGROTEN) 25 MG TAB    Take 1 tablet (25 mg total) by mouth once daily.    DUPILUMAB (DUPIXENT PEN) 300 MG/2 ML PNIJ        " GABAPENTIN (NEURONTIN) 300 MG CAPSULE    Take 1 capsule (300 mg total) by mouth 3 (three) times daily.    HYDROXYCHLOROQUINE (PLAQUENIL) 200 MG TABLET    Take 1 tablet (200 mg total) by mouth 2 (two) times daily.    HYDROXYZINE HCL (ATARAX) 25 MG TABLET    Take 1 tablet (25 mg total) by mouth 2 (two) times a day.    KETOCONAZOLE (NIZORAL) 2 % CREAM    Apply topically once daily.    MAGNESIUM GLUCONATE 27.5 MG MAGNE- SIUM (500 MG) TAB    Take 500 mg by mouth once daily.    METHOCARBAMOL (ROBAXIN) 500 MG TAB    TAKE 1 TABLET BY MOUTH 2 TO 3 TIMES DAILY AS NEEDED FOR SPASMS    NALTREXONE CAPSULE    Take 4.5 mg by mouth once daily.    NORGESTIMATE-ETHINYL ESTRADIOL (ORTHO-CYCLEN) 0.25-35 MG-MCG PER TABLET    Take 1 tablet by mouth once daily.    PANTOPRAZOLE (PROTONIX) 40 MG TABLET    Take 1 tablet (40 mg total) by mouth 2 (two) times daily.    POTASSIUM CHLORIDE SA (K-DUR,KLOR-CON) 20 MEQ TABLET    TAKE 1 TABLET BY MOUTH 2 TIMES DAILY    PROMETHAZINE (PHENERGAN) 25 MG TABLET    Take 1 tablet (25 mg total) by mouth every 4 (four) hours.    PROPRANOLOL (INDERAL LA) 80 MG 24 HR CAPSULE    Take 1 capsule (80 mg total) by mouth once daily.    SALSALATE (DISALCID) 500 MG TAB    Take 500 mg by mouth 2 (two) times daily as needed.    TRIAMCINOLONE ACETONIDE 0.025% (KENALOG) 0.025 % CREAM    Apply topically 2 (two) times daily.    TRIAMCINOLONE ACETONIDE 0.1% (KENALOG) 0.1 % CREAM    2 (two) times daily as needed. Apply to affected area    TURMERIC ROOT EXTRACT 500 MG CAP    Take 500 mg by mouth 2 (two) times daily.     Review of patient's allergies indicates:   Allergen Reactions    Cyclobenzaprine     Cymbalta [duloxetine]     Ibuprofen     Oxycodone     Sulfa (sulfonamide antibiotics)     Toradol [ketorolac]     Tramadol        Physical Exam:   Body mass index is 36.56 kg/m².    GENERAL: Well appearing, in no acute distress.  HEAD: Normocephalic and atraumatic.  ENT: External ears and nose grossly normal.  EYES: EOMI  bilaterally  PULMONARY: Respirations are grossly even and non-labored.  NEURO: Awake, alert, and oriented x 3.  SKIN: No obvious rashes appreciated.  PSYCH: Mood & affect are appropriate.    Detailed MSK exam:     Right knee exam:   -ROM: extension 0, flexion 120  -TTP: medial and lateral joint lines, popliteal fossa  -effusion: trace  -Patellar apprehension negative  -Yakelin test negative  -stable to varus and valgus stress tests  -Lachman test negative, anterior drawer test negative, posterior drawer test negative    Left knee exam:   -ROM: extension 0, flexion 120  -TTP: medial and lateral joint lines, popliteal fossa  -effusion: trace  -Patellar apprehension negative  -Yakelin test negative  -stable to varus and valgus stress tests  -Lachman test negative, anterior drawer test negative, posterior drawer test negative      Imaging:  MRI Knee Without Contrast Right  Narrative: EXAM:  MRI KNEE WITHOUT CONTRAST RIGHT    CLINICAL HISTORY:  Right knee pain.  Chronic and refractory to conservative treatment.    TECHNIQUE: Standard multiplanar, multisequence MR imaging protocol of the right knee was performed.    FINDINGS:  MENISCI:  Medial: No discernible tear.  Lateral: No discernible tear.    LIGAMENTS: Cruciate ligaments are intact.  Collateral ligaments are intact.    MUSCULOTENDINOUS: Extensor mechanism intact.    CARTILAGE:  Patellofemoral compartment: 8 mm CC dimension low-grade chondral erosion at the mid aspect of the trochlear groove with mild extension into the trochlear facets.  Otherwise intact cartilage.  Medial compartment: Preserved.  Lateral compartment: Thin, 1 cm AP dimension high-grade chondral erosion/fissure at the mid/posterior weightbearing femoral condyle.  Remaining weightbearing articular cartilage demonstrates mild thinning and irregularity, greatest at the peripheral articulation.  Mild associated subchondral edema.    BONES: No acute fracture or suspicious osseous lesion.    FLUID: Tiny  joint effusion with mild synovitis. No intra-articular body.  Tiny popliteal Baker's cyst.  Impression: 1.  No acute abnormality.  Intact cruciate ligaments and menisci.  2.  Small areas of focal chondrosis at the trochlea and lateral femoral condyle, as above.  Otherwise mild lateral tibiofemoral chondrosis with mild subchondral edema at the peripheral aspect of the tibial plateau.    Finalized on: 12/29/2022 9:43 AM By:  Tyrone Wise III, MD  [facility]# [id number]      2022-12-29 09:45:32.508    [facility]          Relevant imaging results were reviewed and interpreted by me and per my read shows chondral erosions on lateral femoral condyle and trochlear groove.  This was discussed with the patient and / or family today.     Assessment:  Mali Mckinley is a 37 y.o. female following up for bilateral knee pain. Still having pain despite PT, CSI, voltaren gel, methocarbamol, gabapentin and other medications. MRI showed chondral erosion of lateral femoral condyle and trochlear groove.   Plan: Steroid injections given today (see separate procedure note for details). Prior auth for gel injections. Patient had MRI of the left knee back in Aug. She has the same symptoms in both knees despite trying various conservative treatment options. Likely her left knee also has chondral erosions that have developed since having the MRI of her left knee in Aug 2022.   Follow up 4 weeks for gel injections. If gel injections prove ineffective, will refer to knee specialist for surgical consult. All questions answered.     Chondral defect of femoral condyle, unspecified laterality  -     Large Joint Aspiration/Injection: bilateral knee  -     Prior authorization Order      MEDICAL NECESSITY FOR VISCOSUPPLEMENTATION: After thorough evaluation of the patient, I have determined that visco-supplementation is medically necessary. The patient has painful degenerative changes of the knee with failure of conservative treatments including lifestyle  modifications and rehabilitation exercises.  Oral analgesis/NSAIDs have not adequately controlled symptoms and there is radiographic evidence of Kellgren Raimundo grade 2 or greater osteoarthritic changes, or in lack of radiographic evidence, there is arthroscopic or other evidence of chondrosis.      Electronically signed:  Higinio Lagos MD, MPH  01/04/2023  7:34 AM

## 2023-01-04 NOTE — PROCEDURES
Large Joint Aspiration/Injection: bilateral knee    Date/Time: 1/4/2023 7:30 AM  Performed by: Higinio Lagos MD  Authorized by: Higinio Lagos MD     Consent Done?:  Yes (Verbal)  Indications:  Arthritis and pain  Site marked: the procedure site was marked    Timeout: prior to procedure the correct patient, procedure, and site was verified    Prep: patient was prepped and draped in usual sterile fashion    Local anesthetic:  Bupivacaine 0.5% without epinephrine and lidocaine 1% without epinephrine    Details:  Needle Size:  22 G  Approach:  Anterolateral  Location:  Knee  Laterality:  Bilateral  Site:  Bilateral knee  Medications (Right):  40 mg triamcinolone acetonide 40 mg/mL  Medications (Left):  40 mg triamcinolone acetonide 40 mg/mL  Patient tolerance:  Patient tolerated the procedure well with no immediate complications

## 2023-01-04 NOTE — PATIENT INSTRUCTIONS
Assessment:  Mali Mckinley is a 37 y.o. female   Chief Complaint   Patient presents with    Right Knee - Pain    Left Knee - Pain       Encounter Diagnosis   Name Primary?    Chondral defect of femoral condyle, unspecified laterality Yes        Plan:  Cortisone injection to bilateral knees  We discussed the proper protocols after the injection such as no submerging pools, baths tubs, or hot tubs for 24 hr.  Showering is okay today.  We also discussed that blood sugars can be elevated after an injection and asked patient to properly checked her sugars over the next few days and contact their PCP if there are any concerns.  We discussed red flags such as fevers, chills, red, warm, tender joint at the area of injection to please seek medical care immediately.    Reviewed MRI results in office with patient  Prior authorization submitted for visco supplementation for bilateral knees - Euflexxa    Follow-up: 4 weeks  or sooner if there are any problems between now and then.    Thank you for choosing [facility] and Dr. Higinio Lagos for your orthopedic & sports medicine care. It is our goal to provide you with exceptional care that will help keep you healthy, active, and get you back in the game.    Please do not hesitate to reach out to us via email, phone, or MyChart with any questions, concerns, or feedback.    If you felt that you received exemplary care today, please consider leaving us feedback on [employer] at:  [email or web address]    If you are experiencing pain/discomfort ,or have questions after 5pm and would like to be connected to the [facility] on-call team, please call this number and specify which Sports Medicine provider is treating you: [phone]

## 2023-01-27 ENCOUNTER — TELEPHONE (OUTPATIENT)
Dept: ORTHOPEDICS | Facility: CLINIC | Age: 38
End: 2023-01-27
Payer: MEDICAID

## 2023-01-27 NOTE — TELEPHONE ENCOUNTER
Contacted patient and let her know that authorization dept is still actively working on getting authorization for gel injections.  Will let patient know prior to appointment if injections are not approved.   ----- Message from Kinrda Vergara sent at 1/27/2023  3:15 PM CST -----  Contact: [phone]  Patient would like to consult with a nurse in regards to her scheduled injections. Please call back at [phone]. Thanks marie

## 2023-02-01 ENCOUNTER — TELEPHONE (OUTPATIENT)
Dept: ORTHOPEDICS | Facility: CLINIC | Age: 38
End: 2023-02-01
Payer: MEDICAID

## 2023-02-01 NOTE — TELEPHONE ENCOUNTER
Contacted patient and r./s to next Wednesday, February 8 at 7:30 for gel injection due to insurance still pending on authorization. Patient verbalized understanding of new appt time

## 2023-02-01 NOTE — TELEPHONE ENCOUNTER
Contacted patient to let her know that authorization is still pending on gel injections.  Let her know that we will let her know by the end of business today if appointment for tomorrow needs to be rescheduled. Patient verbalized understanding.

## 2023-02-03 ENCOUNTER — TELEPHONE (OUTPATIENT)
Dept: ORTHOPEDICS | Facility: CLINIC | Age: 38
End: 2023-02-03
Payer: MEDICAID

## 2023-02-03 NOTE — TELEPHONE ENCOUNTER
LVM letting patient know that gel injections have been authorized and that we will see her at her scheduled appointment.

## 2023-02-08 ENCOUNTER — OFFICE VISIT (OUTPATIENT)
Dept: ORTHOPEDICS | Facility: CLINIC | Age: 38
End: 2023-02-08
Payer: MEDICAID

## 2023-02-08 VITALS — WEIGHT: 213 LBS | HEIGHT: 64 IN | BODY MASS INDEX: 36.37 KG/M2

## 2023-02-08 DIAGNOSIS — M22.2X1 PATELLOFEMORAL PAIN SYNDROME OF BOTH KNEES: ICD-10-CM

## 2023-02-08 DIAGNOSIS — M22.2X2 PATELLOFEMORAL PAIN SYNDROME OF BOTH KNEES: ICD-10-CM

## 2023-02-08 DIAGNOSIS — M23.8X9 CHONDRAL DEFECT OF FEMORAL CONDYLE, UNSPECIFIED LATERALITY: Primary | ICD-10-CM

## 2023-02-08 PROCEDURE — 20610 DRAIN/INJ JOINT/BURSA W/O US: CPT | Mod: 50,PBBFAC | Performed by: STUDENT IN AN ORGANIZED HEALTH CARE EDUCATION/TRAINING PROGRAM

## 2023-02-08 PROCEDURE — 3008F PR BODY MASS INDEX (BMI) DOCUMENTED: ICD-10-PCS | Mod: CPTII,,, | Performed by: STUDENT IN AN ORGANIZED HEALTH CARE EDUCATION/TRAINING PROGRAM

## 2023-02-08 PROCEDURE — 99999 PR PBB SHADOW E&M-EST. PATIENT-LVL III: ICD-10-PCS | Mod: PBBFAC,,, | Performed by: STUDENT IN AN ORGANIZED HEALTH CARE EDUCATION/TRAINING PROGRAM

## 2023-02-08 PROCEDURE — 3008F BODY MASS INDEX DOCD: CPT | Mod: CPTII,,, | Performed by: STUDENT IN AN ORGANIZED HEALTH CARE EDUCATION/TRAINING PROGRAM

## 2023-02-08 PROCEDURE — 1160F PR REVIEW ALL MEDS BY PRESCRIBER/CLIN PHARMACIST DOCUMENTED: ICD-10-PCS | Mod: CPTII,,, | Performed by: STUDENT IN AN ORGANIZED HEALTH CARE EDUCATION/TRAINING PROGRAM

## 2023-02-08 PROCEDURE — 99999 PR PBB SHADOW E&M-EST. PATIENT-LVL III: CPT | Mod: PBBFAC,,, | Performed by: STUDENT IN AN ORGANIZED HEALTH CARE EDUCATION/TRAINING PROGRAM

## 2023-02-08 PROCEDURE — 20610 LARGE JOINT ASPIRATION/INJECTION: BILATERAL KNEE: ICD-10-PCS | Mod: 50,S$PBB,, | Performed by: STUDENT IN AN ORGANIZED HEALTH CARE EDUCATION/TRAINING PROGRAM

## 2023-02-08 PROCEDURE — 99499 UNLISTED E&M SERVICE: CPT | Mod: S$PBB,,, | Performed by: STUDENT IN AN ORGANIZED HEALTH CARE EDUCATION/TRAINING PROGRAM

## 2023-02-08 PROCEDURE — 1159F PR MEDICATION LIST DOCUMENTED IN MEDICAL RECORD: ICD-10-PCS | Mod: CPTII,,, | Performed by: STUDENT IN AN ORGANIZED HEALTH CARE EDUCATION/TRAINING PROGRAM

## 2023-02-08 PROCEDURE — 1160F RVW MEDS BY RX/DR IN RCRD: CPT | Mod: CPTII,,, | Performed by: STUDENT IN AN ORGANIZED HEALTH CARE EDUCATION/TRAINING PROGRAM

## 2023-02-08 PROCEDURE — 99499 NO LOS: ICD-10-PCS | Mod: S$PBB,,, | Performed by: STUDENT IN AN ORGANIZED HEALTH CARE EDUCATION/TRAINING PROGRAM

## 2023-02-08 PROCEDURE — 1159F MED LIST DOCD IN RCRD: CPT | Mod: CPTII,,, | Performed by: STUDENT IN AN ORGANIZED HEALTH CARE EDUCATION/TRAINING PROGRAM

## 2023-02-08 PROCEDURE — 99213 OFFICE O/P EST LOW 20 MIN: CPT | Mod: PBBFAC,25 | Performed by: STUDENT IN AN ORGANIZED HEALTH CARE EDUCATION/TRAINING PROGRAM

## 2023-02-08 RX ADMIN — Medication 20 MG: at 07:02

## 2023-02-08 NOTE — PROCEDURES
Large Joint Aspiration/Injection: bilateral knee    Date/Time: 2/8/2023 7:30 AM  Performed by: Higinio Lagos MD  Authorized by: Higinio Lagos MD     Consent Done?:  Yes (Verbal)  Indications:  Arthritis and pain  Site marked: the procedure site was marked    Timeout: prior to procedure the correct patient, procedure, and site was verified    Prep: patient was prepped and draped in usual sterile fashion      Details:  Needle Size:  22 G  Approach:  Anterolateral  Location:  Knee  Laterality:  Bilateral  Site:  Bilateral knee  Medications (Right):  20 mg sodium hyaluronate (EUFLEXXA) 10 mg/mL(mw 2.4 -3.6 million)  Medications (Left):  20 mg sodium hyaluronate (EUFLEXXA) 10 mg/mL(mw 2.4 -3.6 million)  Patient tolerance:  Patient tolerated the procedure well with no immediate complications

## 2023-02-15 ENCOUNTER — OFFICE VISIT (OUTPATIENT)
Dept: ORTHOPEDICS | Facility: CLINIC | Age: 38
End: 2023-02-15
Payer: MEDICAID

## 2023-02-15 VITALS — WEIGHT: 212.94 LBS | HEIGHT: 64 IN | BODY MASS INDEX: 36.35 KG/M2

## 2023-02-15 DIAGNOSIS — M22.2X2 PATELLOFEMORAL PAIN SYNDROME OF BOTH KNEES: ICD-10-CM

## 2023-02-15 DIAGNOSIS — M25.562 PAIN IN BOTH KNEES, UNSPECIFIED CHRONICITY: ICD-10-CM

## 2023-02-15 DIAGNOSIS — M22.2X1 PATELLOFEMORAL PAIN SYNDROME OF BOTH KNEES: ICD-10-CM

## 2023-02-15 DIAGNOSIS — M25.561 PAIN IN BOTH KNEES, UNSPECIFIED CHRONICITY: ICD-10-CM

## 2023-02-15 DIAGNOSIS — M23.8X9 CHONDRAL DEFECT OF FEMORAL CONDYLE, UNSPECIFIED LATERALITY: Primary | ICD-10-CM

## 2023-02-15 PROCEDURE — 99212 OFFICE O/P EST SF 10 MIN: CPT | Mod: PBBFAC,25 | Performed by: STUDENT IN AN ORGANIZED HEALTH CARE EDUCATION/TRAINING PROGRAM

## 2023-02-15 PROCEDURE — 20610 LARGE JOINT ASPIRATION/INJECTION: BILATERAL KNEE: ICD-10-PCS | Mod: 50,S$PBB,, | Performed by: STUDENT IN AN ORGANIZED HEALTH CARE EDUCATION/TRAINING PROGRAM

## 2023-02-15 PROCEDURE — 3008F BODY MASS INDEX DOCD: CPT | Mod: CPTII,,, | Performed by: STUDENT IN AN ORGANIZED HEALTH CARE EDUCATION/TRAINING PROGRAM

## 2023-02-15 PROCEDURE — 20610 DRAIN/INJ JOINT/BURSA W/O US: CPT | Mod: 50,PBBFAC | Performed by: STUDENT IN AN ORGANIZED HEALTH CARE EDUCATION/TRAINING PROGRAM

## 2023-02-15 PROCEDURE — 99499 NO LOS: ICD-10-PCS | Mod: S$PBB,,, | Performed by: STUDENT IN AN ORGANIZED HEALTH CARE EDUCATION/TRAINING PROGRAM

## 2023-02-15 PROCEDURE — 1159F MED LIST DOCD IN RCRD: CPT | Mod: CPTII,,, | Performed by: STUDENT IN AN ORGANIZED HEALTH CARE EDUCATION/TRAINING PROGRAM

## 2023-02-15 PROCEDURE — 1160F RVW MEDS BY RX/DR IN RCRD: CPT | Mod: CPTII,,, | Performed by: STUDENT IN AN ORGANIZED HEALTH CARE EDUCATION/TRAINING PROGRAM

## 2023-02-15 PROCEDURE — 3008F PR BODY MASS INDEX (BMI) DOCUMENTED: ICD-10-PCS | Mod: CPTII,,, | Performed by: STUDENT IN AN ORGANIZED HEALTH CARE EDUCATION/TRAINING PROGRAM

## 2023-02-15 PROCEDURE — 1160F PR REVIEW ALL MEDS BY PRESCRIBER/CLIN PHARMACIST DOCUMENTED: ICD-10-PCS | Mod: CPTII,,, | Performed by: STUDENT IN AN ORGANIZED HEALTH CARE EDUCATION/TRAINING PROGRAM

## 2023-02-15 PROCEDURE — 99499 UNLISTED E&M SERVICE: CPT | Mod: S$PBB,,, | Performed by: STUDENT IN AN ORGANIZED HEALTH CARE EDUCATION/TRAINING PROGRAM

## 2023-02-15 PROCEDURE — 99999 PR PBB SHADOW E&M-EST. PATIENT-LVL II: ICD-10-PCS | Mod: PBBFAC,,, | Performed by: STUDENT IN AN ORGANIZED HEALTH CARE EDUCATION/TRAINING PROGRAM

## 2023-02-15 PROCEDURE — 1159F PR MEDICATION LIST DOCUMENTED IN MEDICAL RECORD: ICD-10-PCS | Mod: CPTII,,, | Performed by: STUDENT IN AN ORGANIZED HEALTH CARE EDUCATION/TRAINING PROGRAM

## 2023-02-15 PROCEDURE — 99999 PR PBB SHADOW E&M-EST. PATIENT-LVL II: CPT | Mod: PBBFAC,,, | Performed by: STUDENT IN AN ORGANIZED HEALTH CARE EDUCATION/TRAINING PROGRAM

## 2023-02-15 RX ADMIN — Medication 20 MG: at 07:02

## 2023-02-15 NOTE — PROCEDURES
Large Joint Aspiration/Injection: bilateral knee    Date/Time: 2/15/2023 7:30 AM  Performed by: Higinio Lagos MD  Authorized by: Higinio Lagos MD     Consent Done?:  Yes (Verbal)  Indications:  Pain  Site marked: the procedure site was marked    Timeout: prior to procedure the correct patient, procedure, and site was verified    Prep: patient was prepped and draped in usual sterile fashion      Details:  Needle Size:  22 G  Approach:  Anterolateral  Location:  Knee  Laterality:  Bilateral  Site:  Bilateral knee  Medications (Right):  20 mg sodium hyaluronate (EUFLEXXA) 10 mg/mL(mw 2.4 -3.6 million)  Medications (Left):  20 mg sodium hyaluronate (EUFLEXXA) 10 mg/mL(mw 2.4 -3.6 million)  Patient tolerance:  Patient tolerated the procedure well with no immediate complications

## 2023-02-22 ENCOUNTER — OFFICE VISIT (OUTPATIENT)
Dept: ORTHOPEDICS | Facility: CLINIC | Age: 38
End: 2023-02-22
Payer: MEDICAID

## 2023-02-22 VITALS — BODY MASS INDEX: 36.19 KG/M2 | WEIGHT: 212 LBS | HEIGHT: 64 IN

## 2023-02-22 DIAGNOSIS — M23.8X9 CHONDRAL DEFECT OF FEMORAL CONDYLE, UNSPECIFIED LATERALITY: Primary | ICD-10-CM

## 2023-02-22 DIAGNOSIS — M25.561 CHRONIC PAIN OF BOTH KNEES: ICD-10-CM

## 2023-02-22 DIAGNOSIS — M25.562 CHRONIC PAIN OF BOTH KNEES: ICD-10-CM

## 2023-02-22 DIAGNOSIS — G89.29 CHRONIC PAIN OF BOTH KNEES: ICD-10-CM

## 2023-02-22 PROCEDURE — 1160F PR REVIEW ALL MEDS BY PRESCRIBER/CLIN PHARMACIST DOCUMENTED: ICD-10-PCS | Mod: CPTII,,, | Performed by: STUDENT IN AN ORGANIZED HEALTH CARE EDUCATION/TRAINING PROGRAM

## 2023-02-22 PROCEDURE — 99214 OFFICE O/P EST MOD 30 MIN: CPT | Mod: PBBFAC | Performed by: STUDENT IN AN ORGANIZED HEALTH CARE EDUCATION/TRAINING PROGRAM

## 2023-02-22 PROCEDURE — 1159F PR MEDICATION LIST DOCUMENTED IN MEDICAL RECORD: ICD-10-PCS | Mod: CPTII,,, | Performed by: STUDENT IN AN ORGANIZED HEALTH CARE EDUCATION/TRAINING PROGRAM

## 2023-02-22 PROCEDURE — 99499 NO LOS: ICD-10-PCS | Mod: S$PBB,,, | Performed by: STUDENT IN AN ORGANIZED HEALTH CARE EDUCATION/TRAINING PROGRAM

## 2023-02-22 PROCEDURE — 3008F PR BODY MASS INDEX (BMI) DOCUMENTED: ICD-10-PCS | Mod: CPTII,,, | Performed by: STUDENT IN AN ORGANIZED HEALTH CARE EDUCATION/TRAINING PROGRAM

## 2023-02-22 PROCEDURE — 20610 DRAIN/INJ JOINT/BURSA W/O US: CPT | Mod: 50,PBBFAC | Performed by: STUDENT IN AN ORGANIZED HEALTH CARE EDUCATION/TRAINING PROGRAM

## 2023-02-22 PROCEDURE — 20610 LARGE JOINT ASPIRATION/INJECTION: BILATERAL KNEE: ICD-10-PCS | Mod: 50,S$PBB,, | Performed by: STUDENT IN AN ORGANIZED HEALTH CARE EDUCATION/TRAINING PROGRAM

## 2023-02-22 PROCEDURE — 99499 UNLISTED E&M SERVICE: CPT | Mod: S$PBB,,, | Performed by: STUDENT IN AN ORGANIZED HEALTH CARE EDUCATION/TRAINING PROGRAM

## 2023-02-22 PROCEDURE — 1160F RVW MEDS BY RX/DR IN RCRD: CPT | Mod: CPTII,,, | Performed by: STUDENT IN AN ORGANIZED HEALTH CARE EDUCATION/TRAINING PROGRAM

## 2023-02-22 PROCEDURE — 99999 PR PBB SHADOW E&M-EST. PATIENT-LVL IV: ICD-10-PCS | Mod: PBBFAC,,, | Performed by: STUDENT IN AN ORGANIZED HEALTH CARE EDUCATION/TRAINING PROGRAM

## 2023-02-22 PROCEDURE — 1159F MED LIST DOCD IN RCRD: CPT | Mod: CPTII,,, | Performed by: STUDENT IN AN ORGANIZED HEALTH CARE EDUCATION/TRAINING PROGRAM

## 2023-02-22 PROCEDURE — 3008F BODY MASS INDEX DOCD: CPT | Mod: CPTII,,, | Performed by: STUDENT IN AN ORGANIZED HEALTH CARE EDUCATION/TRAINING PROGRAM

## 2023-02-22 PROCEDURE — 99999 PR PBB SHADOW E&M-EST. PATIENT-LVL IV: CPT | Mod: PBBFAC,,, | Performed by: STUDENT IN AN ORGANIZED HEALTH CARE EDUCATION/TRAINING PROGRAM

## 2023-02-22 RX ADMIN — Medication 20 MG: at 07:02

## 2023-02-22 NOTE — PATIENT INSTRUCTIONS
Assessment:  Mali Mckinley is a 37 y.o. female   Chief Complaint   Patient presents with    Right Knee - Pain    Left Knee - Pain       No diagnosis found.     Plan:  Bilateral knee Euflexxa injections  We discussed the proper protocols after the injection such as no submerging pools, baths tubs, or hot tubs for 24 hr.  Showering is okay today.  We also discussed that blood sugars can be elevated after an injection and asked patient to properly checked her sugars over the next few days and contact their PCP if there are any concerns.  We discussed red flags such as fevers, chills, red, warm, tender joint at the area of injection to please seek medical care immediately.    Follow up in 2 months     Follow-up: 2 months or sooner if there are any problems between now and then.    Thank you for choosing [facility] and Dr. Higinio Lagos for your orthopedic & sports medicine care. It is our goal to provide you with exceptional care that will help keep you healthy, active, and get you back in the game.    Please do not hesitate to reach out to us via email, phone, or MyChart with any questions, concerns, or feedback.    If you felt that you received exemplary care today, please consider leaving us feedback on [employer] at:  [email or web address]    If you are experiencing pain/discomfort ,or have questions after 5pm and would like to be connected to the [facility] on-call team, please call this number and specify which Sports Medicine provider is treating you: [phone]

## 2023-02-22 NOTE — PROCEDURES
Large Joint Aspiration/Injection: bilateral knee    Date/Time: 2/22/2023 7:30 AM  Performed by: Higinio Lagos MD  Authorized by: Higinio Lagos MD     Consent Done?:  Yes (Verbal)  Indications:  Pain  Site marked: the procedure site was marked    Timeout: prior to procedure the correct patient, procedure, and site was verified    Prep: patient was prepped and draped in usual sterile fashion      Details:  Needle Size:  22 G  Approach:  Anterolateral  Location:  Knee  Laterality:  Bilateral  Site:  Bilateral knee  Medications (Right):  20 mg sodium hyaluronate (EUFLEXXA) 10 mg/mL(mw 2.4 -3.6 million)  Medications (Left):  20 mg sodium hyaluronate (EUFLEXXA) 10 mg/mL(mw 2.4 -3.6 million)  Patient tolerance:  Patient tolerated the procedure well with no immediate complications

## 2023-04-18 ENCOUNTER — TELEPHONE (OUTPATIENT)
Dept: SPORTS MEDICINE | Facility: CLINIC | Age: 38
End: 2023-04-18
Payer: MEDICAID

## 2023-04-18 NOTE — TELEPHONE ENCOUNTER
Contacted patient to r/s appt for 4/26 at 7:40 due to provider having a meeting r/s to that time.  Patient agreed to come in at 8:40 for appt.

## 2023-04-26 ENCOUNTER — OFFICE VISIT (OUTPATIENT)
Dept: SPORTS MEDICINE | Facility: CLINIC | Age: 38
End: 2023-04-26
Payer: MEDICAID

## 2023-04-26 VITALS — WEIGHT: 215.63 LBS | BODY MASS INDEX: 36.81 KG/M2 | HEIGHT: 64 IN

## 2023-04-26 DIAGNOSIS — M25.562 CHRONIC PAIN OF LEFT KNEE: ICD-10-CM

## 2023-04-26 DIAGNOSIS — M25.561 CHRONIC PAIN OF RIGHT KNEE: Primary | ICD-10-CM

## 2023-04-26 DIAGNOSIS — M22.2X1 PATELLOFEMORAL PAIN SYNDROME OF BOTH KNEES: ICD-10-CM

## 2023-04-26 DIAGNOSIS — G89.29 CHRONIC PAIN OF LEFT KNEE: ICD-10-CM

## 2023-04-26 DIAGNOSIS — M22.2X2 PATELLOFEMORAL PAIN SYNDROME OF BOTH KNEES: ICD-10-CM

## 2023-04-26 DIAGNOSIS — G89.29 CHRONIC PAIN OF RIGHT KNEE: Primary | ICD-10-CM

## 2023-04-26 DIAGNOSIS — M23.8X1 CHONDRAL DEFECT OF CONDYLE OF RIGHT FEMUR: ICD-10-CM

## 2023-04-26 PROCEDURE — 3008F BODY MASS INDEX DOCD: CPT | Mod: CPTII,,, | Performed by: STUDENT IN AN ORGANIZED HEALTH CARE EDUCATION/TRAINING PROGRAM

## 2023-04-26 PROCEDURE — 1160F PR REVIEW ALL MEDS BY PRESCRIBER/CLIN PHARMACIST DOCUMENTED: ICD-10-PCS | Mod: CPTII,,, | Performed by: STUDENT IN AN ORGANIZED HEALTH CARE EDUCATION/TRAINING PROGRAM

## 2023-04-26 PROCEDURE — 20610 LARGE JOINT ASPIRATION/INJECTION: BILATERAL KNEE: ICD-10-PCS | Mod: 50,S$PBB,, | Performed by: STUDENT IN AN ORGANIZED HEALTH CARE EDUCATION/TRAINING PROGRAM

## 2023-04-26 PROCEDURE — 3008F PR BODY MASS INDEX (BMI) DOCUMENTED: ICD-10-PCS | Mod: CPTII,,, | Performed by: STUDENT IN AN ORGANIZED HEALTH CARE EDUCATION/TRAINING PROGRAM

## 2023-04-26 PROCEDURE — 99214 OFFICE O/P EST MOD 30 MIN: CPT | Mod: S$PBB,25,, | Performed by: STUDENT IN AN ORGANIZED HEALTH CARE EDUCATION/TRAINING PROGRAM

## 2023-04-26 PROCEDURE — 3061F NEG MICROALBUMINURIA REV: CPT | Mod: CPTII,,, | Performed by: STUDENT IN AN ORGANIZED HEALTH CARE EDUCATION/TRAINING PROGRAM

## 2023-04-26 PROCEDURE — 1159F PR MEDICATION LIST DOCUMENTED IN MEDICAL RECORD: ICD-10-PCS | Mod: CPTII,,, | Performed by: STUDENT IN AN ORGANIZED HEALTH CARE EDUCATION/TRAINING PROGRAM

## 2023-04-26 PROCEDURE — 3061F PR NEG MICROALBUMINURIA RESULT DOCUMENTED/REVIEW: ICD-10-PCS | Mod: CPTII,,, | Performed by: STUDENT IN AN ORGANIZED HEALTH CARE EDUCATION/TRAINING PROGRAM

## 2023-04-26 PROCEDURE — 3044F HG A1C LEVEL LT 7.0%: CPT | Mod: CPTII,,, | Performed by: STUDENT IN AN ORGANIZED HEALTH CARE EDUCATION/TRAINING PROGRAM

## 2023-04-26 PROCEDURE — 20610 DRAIN/INJ JOINT/BURSA W/O US: CPT | Mod: 50,PBBFAC | Performed by: STUDENT IN AN ORGANIZED HEALTH CARE EDUCATION/TRAINING PROGRAM

## 2023-04-26 PROCEDURE — 3066F NEPHROPATHY DOC TX: CPT | Mod: CPTII,,, | Performed by: STUDENT IN AN ORGANIZED HEALTH CARE EDUCATION/TRAINING PROGRAM

## 2023-04-26 PROCEDURE — 99999 PR PBB SHADOW E&M-EST. PATIENT-LVL IV: ICD-10-PCS | Mod: PBBFAC,,, | Performed by: STUDENT IN AN ORGANIZED HEALTH CARE EDUCATION/TRAINING PROGRAM

## 2023-04-26 PROCEDURE — 3066F PR DOCUMENTATION OF TREATMENT FOR NEPHROPATHY: ICD-10-PCS | Mod: CPTII,,, | Performed by: STUDENT IN AN ORGANIZED HEALTH CARE EDUCATION/TRAINING PROGRAM

## 2023-04-26 PROCEDURE — 1159F MED LIST DOCD IN RCRD: CPT | Mod: CPTII,,, | Performed by: STUDENT IN AN ORGANIZED HEALTH CARE EDUCATION/TRAINING PROGRAM

## 2023-04-26 PROCEDURE — 99214 OFFICE O/P EST MOD 30 MIN: CPT | Mod: PBBFAC,25 | Performed by: STUDENT IN AN ORGANIZED HEALTH CARE EDUCATION/TRAINING PROGRAM

## 2023-04-26 PROCEDURE — 99999 PR PBB SHADOW E&M-EST. PATIENT-LVL IV: CPT | Mod: PBBFAC,,, | Performed by: STUDENT IN AN ORGANIZED HEALTH CARE EDUCATION/TRAINING PROGRAM

## 2023-04-26 PROCEDURE — 1160F RVW MEDS BY RX/DR IN RCRD: CPT | Mod: CPTII,,, | Performed by: STUDENT IN AN ORGANIZED HEALTH CARE EDUCATION/TRAINING PROGRAM

## 2023-04-26 PROCEDURE — 3044F PR MOST RECENT HEMOGLOBIN A1C LEVEL <7.0%: ICD-10-PCS | Mod: CPTII,,, | Performed by: STUDENT IN AN ORGANIZED HEALTH CARE EDUCATION/TRAINING PROGRAM

## 2023-04-26 PROCEDURE — 99214 PR OFFICE/OUTPT VISIT, EST, LEVL IV, 30-39 MIN: ICD-10-PCS | Mod: S$PBB,25,, | Performed by: STUDENT IN AN ORGANIZED HEALTH CARE EDUCATION/TRAINING PROGRAM

## 2023-04-26 RX ORDER — TRIAMCINOLONE ACETONIDE 40 MG/ML
40 INJECTION, SUSPENSION INTRA-ARTICULAR; INTRAMUSCULAR
Status: DISCONTINUED | OUTPATIENT
Start: 2023-04-26 | End: 2023-04-26 | Stop reason: HOSPADM

## 2023-04-26 RX ADMIN — TRIAMCINOLONE ACETONIDE 40 MG: 200 INJECTION, SUSPENSION INTRA-ARTICULAR; INTRAMUSCULAR at 08:04

## 2023-04-26 NOTE — PROCEDURES
Large Joint Aspiration/Injection: bilateral knee    Date/Time: 4/26/2023 8:40 AM  Performed by: Higinio Lagos MD  Authorized by: Higinio Lagos MD     Consent Done?:  Yes (Verbal)  Indications:  Arthritis and pain  Site marked: the procedure site was marked    Timeout: prior to procedure the correct patient, procedure, and site was verified    Prep: patient was prepped and draped in usual sterile fashion    Local anesthetic:  Bupivacaine 0.5% without epinephrine and lidocaine 1% without epinephrine    Details:  Needle Size:  22 G  Approach:  Anterolateral  Location:  Knee  Laterality:  Bilateral  Site:  Bilateral knee  Medications (Right):  40 mg triamcinolone acetonide 40 mg/mL  Medications (Left):  40 mg triamcinolone acetonide 40 mg/mL  Patient tolerance:  Patient tolerated the procedure well with no immediate complications

## 2023-04-26 NOTE — PATIENT INSTRUCTIONS
Assessment:  Mali Mckinley is a 38 y.o. female   Chief Complaint   Patient presents with    Right Knee - Pain    Left Knee - Pain       Encounter Diagnoses   Name Primary?    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  Showering is okay today.  We also discussed that blood sugars can be elevated after an injection and asked patient to properly checked her sugars over the next few days and contact their PCP if there are any concerns.  We discussed red flags such as fevers, chills, red, warm, tender joint at the area of injection to please seek medical care immediately.    Pre auth for Right Knee Iovera             Follow-up: 4 weeks  or sooner if there are any problems between now and then.    Thank you for choosing [facility] and Dr. Higinio Lagos for your orthopedic & sports medicine care. It is our goal to provide you with exceptional care that will help keep you healthy, active, and get you back in the game.    Please do not hesitate to reach out to us via email, phone, or MyChart with any questions, concerns, or feedback.    If you felt that you received exemplary care today, please consider leaving us feedback on [employer] at:  [email or web address]    If you are experiencing pain/discomfort ,or have questions after 5pm and would like to be connected to the [facility] on-call team, please call this number and specify which Sports Medicine provider is treating you: [phone]

## 2023-04-26 NOTE — PROGRESS NOTES
Patient ID: Mali Mckinley  YOB: 1985  MRN: [id number]    Chief Complaint: Pain of the Right Knee and Pain of the Left Knee        History of Present Illness:  Mali Mckinley is a right hand dominant 36 y/o female who presents today for s/p bilateral knee Euflexxa.  She was last seen in clinic on 2/22/2023.  She states that her knee has been feeling a little bit better and currently rates her pain at a 6/10.  She states that she has completed physical therapy. She still has some pain in her knees.        History of Present Illness:  Mali Mckinley is a right hand dominant 36 y/o female who presents today for review of MRI results of right knee.  She was last seen in clinic on 12/09/2022.  She states that her knee has been feeling a little bit better and currently rates her pain at a 4/10.  She states that she has completed physical therapy and has an appointment scheduled with a Pain Management doctor, Dr. Reese, in [place], on Friday, January 6, 2023.      12/09/2022 Interval History of Present Illness: Mali Mckinley is a right-hand dominant 38 y.o. female who presents today with continued bilateral knee pain rating a 7/10.  Patient reports that she has been attending physical therapy at [facility] and that she is not seeing any improvement in her knee pain.  She reports that the pain is still located in the anterior region of her knee and that it is a constant pain.  She received a CSI at her last office visit of 10/04/2022.     The patient is active in none.  Occupation: Unemployed    10/4/2022 Interval History of Present Illness: Mali Mckinley is a right-hand dominant 37 y.o. female who presents today with bilateral knee pain and is interested in cortisone injections.  Patient states that her pain is an 8/10 today and that she hasn't received any relief from the Medrol dose pack.  She has been performing her HEP and using the  "Voltaren gel 3x / day without relief.  She states that the pain is mainly in the anterior knee region and that the left knee is greater than the right.      9/02/2022 Interval History of Present Illness: Mali Mckinley is a right-hand dominant 37 y.o. female who presents today with bilateral knee pain with the left being greater than the right. Patient rates her pain today at a 7/10.  She states that her right knee has been hurting since July of 2021 and her left knee started to give her issues in June of 2022.  Patient states that she has not had any PHOEBE.  She does report a previous surgery in 2019, by Dr. Kalin Choe, to the left knee for meniscus issue, and states that she had no issues following the surgery.  Patient states that she has pain when walking and that she feels as if something in the front of her knee "shifts down" and causes her pain to where she must stop and move the knee back and forth until it releases so she can walk again.  Patient also reports pain with any movement, going form a standing to a seated position, kneeling on the knee; climbing stairs and getting in and out of her car.  She has tried Meloxicam and physical therapy and states that neither helped alleviate her pain.     Past Medical History:   Past Medical History:   Diagnosis Date    Anxiety     Fibromyalgia     GERD (gastroesophageal reflux disease)     Hypertension     Neuropathy      Past Surgical History:   Procedure Laterality Date    ARTHROSCOPY OF KNEE Left 12/03/2019    Repair of Torn Meniscus    CHOLECYSTECTOMY      ROTATOR CUFF REPAIR Right 2006 and 2012    ROTATOR CUFF REPAIR Left 2007     Family History   Problem Relation Age of Onset    Hypertension Mother     Hypertension Paternal Grandmother     Hyperlipidemia Paternal Grandmother      Social History     Socioeconomic History    Marital status: Single   Occupational History    Occupation: Disabled   Tobacco Use    Smoking status: Never    Smokeless tobacco: " Never   Substance and Sexual Activity    Alcohol use: Not Currently    Drug use: Never    Sexual activity: Not Currently     Medication List with Changes/Refills   Current Medications    AMITRIPTYLINE (ELAVIL) 25 MG TABLET    Take 50 mg by mouth every evening.     CHLORTHALIDONE (HYGROTEN) 25 MG TAB    Take 1 tablet (25 mg total) by mouth once daily.    DUPILUMAB (DUPIXENT PEN) 300 MG/2 ML PNIJ    every 14 (fourteen) days.    FLUOXETINE 20 MG CAPSULE    Take 1 capsule (20 mg total) by mouth once daily.    GABAPENTIN (NEURONTIN) 300 MG CAPSULE    Take 1 capsule (300 mg total) by mouth 3 (three) times daily.    HYDROXYCHLOROQUINE (PLAQUENIL) 200 MG TABLET    Take 1 tablet (200 mg total) by mouth 2 (two) times daily.    HYDROXYZINE HCL (ATARAX) 25 MG TABLET    Take 1 tablet (25 mg total) by mouth 2 (two) times a day.    KETOCONAZOLE (NIZORAL) 2 % CREAM    Apply topically once daily.    MAGNESIUM GLUCONATE 27.5 MG MAGNE- SIUM (500 MG) TAB    Take 500 mg by mouth once daily.    METHOCARBAMOL (ROBAXIN) 500 MG TAB    1 po bid prn muscle sapasms    NALTREXONE CAPSULE    Take 4.5 mg by mouth once daily.    NORGESTIMATE-ETHINYL ESTRADIOL (ORTHO-CYCLEN) 0.25-35 MG-MCG PER TABLET    Take 1 tablet by mouth once daily.    PANTOPRAZOLE (PROTONIX) 40 MG TABLET    TAKE 1 TABLET BY MOUTH 2 TIMES DIALY    POTASSIUM CHLORIDE SA (K-DUR,KLOR-CON) 20 MEQ TABLET    2 tablets twice daily    PROMETHAZINE (PHENERGAN) 25 MG TABLET    Take 1 tablet (25 mg total) by mouth every 4 (four) hours.    PROPRANOLOL (INDERAL LA) 80 MG 24 HR CAPSULE    TAKE 1 CAPSULE BY MOUTH ONCE DAILY    SALSALATE (DISALCID) 500 MG TAB    Take 500 mg by mouth 2 (two) times daily as needed.    TRIAMCINOLONE ACETONIDE 0.025% (KENALOG) 0.025 % CREAM    Apply topically 2 (two) times daily.    TRIAMCINOLONE ACETONIDE 0.1% (KENALOG) 0.1 % CREAM    2 (two) times daily as needed. Apply to affected area    TURMERIC ROOT EXTRACT 500 MG CAP    Take 500 mg by mouth 2 (two) times  daily.     Review of patient's allergies indicates:   Allergen Reactions    Cyclobenzaprine     Cymbalta [duloxetine]     Ibuprofen     Oxycodone     Sulfa (sulfonamide antibiotics)     Toradol [ketorolac]     Tramadol        Physical Exam:   Body mass index is 37.01 kg/m².    GENERAL: Well appearing, in no acute distress.  HEAD: Normocephalic and atraumatic.  ENT: External ears and nose grossly normal.  EYES: EOMI bilaterally  PULMONARY: Respirations are grossly even and non-labored.  NEURO: Awake, alert, and oriented x 3.  SKIN: No obvious rashes appreciated.  PSYCH: Mood & affect are appropriate.    Detailed MSK exam:     Right knee exam:   -ROM: extension 0, flexion 120  -TTP: medial and lateral joint lines, popliteal fossa  -effusion: trace  -Patellar apprehension negative  -Yakelin test negative  -stable to varus and valgus stress tests  -Lachman test negative, anterior drawer test negative, posterior drawer test negative    Left knee exam:   -ROM: extension 0, flexion 120  -TTP: medial and lateral joint lines, popliteal fossa  -effusion: trace  -Patellar apprehension negative  -Yakelin test negative  -stable to varus and valgus stress tests  -Lachman test negative, anterior drawer test negative, posterior drawer test negative      Imaging:  MRI Knee Without Contrast Right  Narrative: EXAM:  MRI KNEE WITHOUT CONTRAST RIGHT    CLINICAL HISTORY:  Right knee pain.  Chronic and refractory to conservative treatment.    TECHNIQUE: Standard multiplanar, multisequence MR imaging protocol of the right knee was performed.    FINDINGS:  MENISCI:  Medial: No discernible tear.  Lateral: No discernible tear.    LIGAMENTS: Cruciate ligaments are intact.  Collateral ligaments are intact.    MUSCULOTENDINOUS: Extensor mechanism intact.    CARTILAGE:  Patellofemoral compartment: 8 mm CC dimension low-grade chondral erosion at the mid aspect of the trochlear groove with mild extension into the trochlear facets.  Otherwise  intact cartilage.  Medial compartment: Preserved.  Lateral compartment: Thin, 1 cm AP dimension high-grade chondral erosion/fissure at the mid/posterior weightbearing femoral condyle.  Remaining weightbearing articular cartilage demonstrates mild thinning and irregularity, greatest at the peripheral articulation.  Mild associated subchondral edema.    BONES: No acute fracture or suspicious osseous lesion.    FLUID: Tiny joint effusion with mild synovitis. No intra-articular body.  Tiny popliteal Baker's cyst.  Impression: 1.  No acute abnormality.  Intact cruciate ligaments and menisci.  2.  Small areas of focal chondrosis at the trochlea and lateral femoral condyle, as above.  Otherwise mild lateral tibiofemoral chondrosis with mild subchondral edema at the peripheral aspect of the tibial plateau.    Finalized on: 12/29/2022 9:43 AM By:  Tyrone Wise III, MD  [facility]# [id number]      2022-12-29 09:45:32.508    [facility]          Relevant imaging results were reviewed and interpreted by me and per my read shows chondral erosions on lateral femoral condyle and trochlear groove.  This was discussed with the patient and / or family today.     Assessment:  Mali Mckinley is a 38 y.o. female following up for bilateral knee pain. Still having pain despite PT, CSI, voltaren gel, methocarbamol, gabapentin and other medications. MRI showed chondral erosion of lateral femoral condyle and trochlear groove.   Plan: Steroid injections given today (see separate procedure note for details). Prior auth for iovera procedure. Iovera info given. Consider knee specialist referral for surgical consult if not improving after iovera.   Follow up 4 weeks for iovera. All questions answered.     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  Risks and benefits were also discussed with patient.  X-rays were reviewed to use as a diagram to explain procedure. A detailed description of landmarks and placement of anesthetic used during procedure were also explained to patient.  Contraindications for procedure were discussed with patient.    More than 50% of the total time was spent face to face for counseling on diagnosis and treatment options. I also counseled patient  on common and most usual side effect of prescribed medications.  I reviewed Primary care, and other specialty's notes to better coordinate patient's care. Patient voiced understanding.       Electronically signed:  Higinio Lagos MD, MPH  04/26/2023  7:34 AM

## 2023-05-02 ENCOUNTER — TELEPHONE (OUTPATIENT)
Dept: SPORTS MEDICINE | Facility: CLINIC | Age: 38
End: 2023-05-02
Payer: MEDICAID

## 2023-05-02 NOTE — TELEPHONE ENCOUNTER
R/S patient's appt from June 1 at 7:40 to June 8 at 7:40.  Patient verbalized understanding of appt date, time and location.   ----- Message from Clem Urena sent at 5/2/2023 12:18 PM CDT -----  Contact: Self - [phone]  Patient is requesting a call back to reschedule her upcoming procedure. Please give her a call back at [phone]

## 2023-05-15 ENCOUNTER — TELEPHONE (OUTPATIENT)
Dept: SPORTS MEDICINE | Facility: CLINIC | Age: 38
End: 2023-05-15
Payer: MEDICAID

## 2023-05-15 NOTE — TELEPHONE ENCOUNTER
Spoke with Diya at [employer] and set up Peer to Peer for May 23 at 10 am for Dr. Lagos to speak with Dr. Hernan Wheeler regarding denial of Iovera.  Provided primary contact number of [phone] secondary number of [phone]

## 2023-05-23 ENCOUNTER — TELEPHONE (OUTPATIENT)
Dept: SPORTS MEDICINE | Facility: CLINIC | Age: 38
End: 2023-05-23
Payer: MEDICAID

## 2023-05-23 NOTE — TELEPHONE ENCOUNTER
Dr. Lagos performed peer to peer with Dr. Hernan Wheeler on 5/23/2023.  Provided Dr. Wheeler with research based articles supporting the use of Iovera for treatment of knee pain.  Dr. Wheeler is going to submit this information to his review board and should have a ruling on case in 48 hours.

## 2023-05-25 ENCOUNTER — TELEPHONE (OUTPATIENT)
Dept: SPORTS MEDICINE | Facility: CLINIC | Age: 38
End: 2023-05-25
Payer: MEDICAID

## 2023-05-25 NOTE — TELEPHONE ENCOUNTER
Mailed request for appeal and appeal designation form to patient on 5/25/2023 to patient for signature and for patient to forward to [employer].  R/S patient's appt to July due to pending denial of Iovera procedure and waiting on results of appeal.

## 2023-06-05 ENCOUNTER — TELEPHONE (OUTPATIENT)
Dept: SPORTS MEDICINE | Facility: CLINIC | Age: 38
End: 2023-06-05
Payer: MEDICAID

## 2023-06-05 NOTE — TELEPHONE ENCOUNTER
Contacted patient and confirmed that she sent required paperwork to insurance companies as requested to start the appeals / grievance process.   ----- Message from Faye Hwang sent at 6/5/2023 11:27 AM CDT -----  Contact: Pt Mobile [phone]  Patient is calling in regards to her wanting to let you know that she mailed you a letter on today for a appeal and a grievance.

## 2023-06-26 ENCOUNTER — TELEPHONE (OUTPATIENT)
Dept: SPORTS MEDICINE | Facility: CLINIC | Age: 38
End: 2023-06-26
Payer: MEDICAID

## 2023-06-26 NOTE — TELEPHONE ENCOUNTER
Patient contacted office to inform us that she received denial letter on appeal for Iovera.  Stated that her knees were feeling good and that she believes the steroid injections were beneficial and would like to repeat them when she is able.  Scheduled her for repeat CSI on 8/2/2023.  Patient verbalized understanding of appt date, time and location.

## 2023-08-02 ENCOUNTER — OFFICE VISIT (OUTPATIENT)
Dept: SPORTS MEDICINE | Facility: CLINIC | Age: 38
End: 2023-08-02
Payer: MEDICAID

## 2023-08-02 VITALS — HEIGHT: 64 IN | WEIGHT: 209 LBS | BODY MASS INDEX: 35.68 KG/M2

## 2023-08-02 DIAGNOSIS — M22.2X2 PATELLOFEMORAL PAIN SYNDROME OF BOTH KNEES: Primary | ICD-10-CM

## 2023-08-02 DIAGNOSIS — M22.2X1 PATELLOFEMORAL PAIN SYNDROME OF BOTH KNEES: Primary | ICD-10-CM

## 2023-08-02 DIAGNOSIS — M23.8X1 CHONDRAL DEFECT OF CONDYLE OF RIGHT FEMUR: ICD-10-CM

## 2023-08-02 DIAGNOSIS — M25.561 CHRONIC PAIN OF BOTH KNEES: ICD-10-CM

## 2023-08-02 DIAGNOSIS — M25.562 CHRONIC PAIN OF BOTH KNEES: ICD-10-CM

## 2023-08-02 DIAGNOSIS — G89.29 CHRONIC PAIN OF BOTH KNEES: ICD-10-CM

## 2023-08-02 PROCEDURE — 99213 OFFICE O/P EST LOW 20 MIN: CPT | Mod: S$PBB,25,, | Performed by: STUDENT IN AN ORGANIZED HEALTH CARE EDUCATION/TRAINING PROGRAM

## 2023-08-02 PROCEDURE — 3061F PR NEG MICROALBUMINURIA RESULT DOCUMENTED/REVIEW: ICD-10-PCS | Mod: CPTII,,, | Performed by: STUDENT IN AN ORGANIZED HEALTH CARE EDUCATION/TRAINING PROGRAM

## 2023-08-02 PROCEDURE — 99999PBSHW PR PBB SHADOW TECHNICAL ONLY FILED TO HB: ICD-10-PCS | Mod: PBBFAC,,,

## 2023-08-02 PROCEDURE — 1159F MED LIST DOCD IN RCRD: CPT | Mod: CPTII,,, | Performed by: STUDENT IN AN ORGANIZED HEALTH CARE EDUCATION/TRAINING PROGRAM

## 2023-08-02 PROCEDURE — 1160F PR REVIEW ALL MEDS BY PRESCRIBER/CLIN PHARMACIST DOCUMENTED: ICD-10-PCS | Mod: CPTII,,, | Performed by: STUDENT IN AN ORGANIZED HEALTH CARE EDUCATION/TRAINING PROGRAM

## 2023-08-02 PROCEDURE — 20610 LARGE JOINT ASPIRATION/INJECTION: BILATERAL KNEE: ICD-10-PCS | Mod: 50,S$PBB,, | Performed by: STUDENT IN AN ORGANIZED HEALTH CARE EDUCATION/TRAINING PROGRAM

## 2023-08-02 PROCEDURE — 3008F PR BODY MASS INDEX (BMI) DOCUMENTED: ICD-10-PCS | Mod: CPTII,,, | Performed by: STUDENT IN AN ORGANIZED HEALTH CARE EDUCATION/TRAINING PROGRAM

## 2023-08-02 PROCEDURE — 99999PBSHW PR PBB SHADOW TECHNICAL ONLY FILED TO HB: Mod: PBBFAC,,,

## 2023-08-02 PROCEDURE — 3066F PR DOCUMENTATION OF TREATMENT FOR NEPHROPATHY: ICD-10-PCS | Mod: CPTII,,, | Performed by: STUDENT IN AN ORGANIZED HEALTH CARE EDUCATION/TRAINING PROGRAM

## 2023-08-02 PROCEDURE — 99999 PR PBB SHADOW E&M-EST. PATIENT-LVL IV: CPT | Mod: PBBFAC,,, | Performed by: STUDENT IN AN ORGANIZED HEALTH CARE EDUCATION/TRAINING PROGRAM

## 2023-08-02 PROCEDURE — 3008F BODY MASS INDEX DOCD: CPT | Mod: CPTII,,, | Performed by: STUDENT IN AN ORGANIZED HEALTH CARE EDUCATION/TRAINING PROGRAM

## 2023-08-02 PROCEDURE — 3044F PR MOST RECENT HEMOGLOBIN A1C LEVEL <7.0%: ICD-10-PCS | Mod: CPTII,,, | Performed by: STUDENT IN AN ORGANIZED HEALTH CARE EDUCATION/TRAINING PROGRAM

## 2023-08-02 PROCEDURE — 3066F NEPHROPATHY DOC TX: CPT | Mod: CPTII,,, | Performed by: STUDENT IN AN ORGANIZED HEALTH CARE EDUCATION/TRAINING PROGRAM

## 2023-08-02 PROCEDURE — 1160F RVW MEDS BY RX/DR IN RCRD: CPT | Mod: CPTII,,, | Performed by: STUDENT IN AN ORGANIZED HEALTH CARE EDUCATION/TRAINING PROGRAM

## 2023-08-02 PROCEDURE — 99999 PR PBB SHADOW E&M-EST. PATIENT-LVL IV: ICD-10-PCS | Mod: PBBFAC,,, | Performed by: STUDENT IN AN ORGANIZED HEALTH CARE EDUCATION/TRAINING PROGRAM

## 2023-08-02 PROCEDURE — 99213 PR OFFICE/OUTPT VISIT, EST, LEVL III, 20-29 MIN: ICD-10-PCS | Mod: S$PBB,25,, | Performed by: STUDENT IN AN ORGANIZED HEALTH CARE EDUCATION/TRAINING PROGRAM

## 2023-08-02 PROCEDURE — 99214 OFFICE O/P EST MOD 30 MIN: CPT | Mod: PBBFAC,25 | Performed by: STUDENT IN AN ORGANIZED HEALTH CARE EDUCATION/TRAINING PROGRAM

## 2023-08-02 PROCEDURE — 3044F HG A1C LEVEL LT 7.0%: CPT | Mod: CPTII,,, | Performed by: STUDENT IN AN ORGANIZED HEALTH CARE EDUCATION/TRAINING PROGRAM

## 2023-08-02 PROCEDURE — 3061F NEG MICROALBUMINURIA REV: CPT | Mod: CPTII,,, | Performed by: STUDENT IN AN ORGANIZED HEALTH CARE EDUCATION/TRAINING PROGRAM

## 2023-08-02 PROCEDURE — 20610 DRAIN/INJ JOINT/BURSA W/O US: CPT | Mod: 50,PBBFAC | Performed by: STUDENT IN AN ORGANIZED HEALTH CARE EDUCATION/TRAINING PROGRAM

## 2023-08-02 PROCEDURE — 1159F PR MEDICATION LIST DOCUMENTED IN MEDICAL RECORD: ICD-10-PCS | Mod: CPTII,,, | Performed by: STUDENT IN AN ORGANIZED HEALTH CARE EDUCATION/TRAINING PROGRAM

## 2023-08-02 RX ORDER — TRIAMCINOLONE ACETONIDE 40 MG/ML
40 INJECTION, SUSPENSION INTRA-ARTICULAR; INTRAMUSCULAR
Status: DISCONTINUED | OUTPATIENT
Start: 2023-08-02 | End: 2023-08-02 | Stop reason: HOSPADM

## 2023-08-02 RX ADMIN — TRIAMCINOLONE ACETONIDE 40 MG: 200 INJECTION, SUSPENSION INTRA-ARTICULAR; INTRAMUSCULAR at 07:08

## 2023-08-02 NOTE — PATIENT INSTRUCTIONS
Assessment:  Mali Mckinley is a 38 y.o. female   Chief Complaint   Patient presents with    Left Knee - Pain    Right Knee - Pain       No diagnosis found.     Plan:  Bilateral cortisone injections to knees  We discussed the proper protocols after the injection such as no submerging pools, baths tubs, or hot tubs for 24 hr.  Showering is okay today.  We also discussed that blood sugars can be elevated after an injection and asked patient to properly checked her sugars over the next few days and contact their PCP if there are any concerns.  We discussed red flags such as fevers, chills, red, warm, tender joint at the area of injection to please seek medical care immediately.    Pre authorization for 3 series gel injection to bilateral knees - Euflexxa    Follow-up: 1 month or sooner if there are any problems between now and then.    Thank you for choosing [facility] and Dr. Higinio Lagos for your orthopedic & sports medicine care. It is our goal to provide you with exceptional care that will help keep you healthy, active, and get you back in the game.    Please do not hesitate to reach out to us via email, phone, or MyChart with any questions, concerns, or feedback.    If you felt that you received exemplary care today, please consider leaving us feedback on [employer] at:  [email or web address]    If you are experiencing pain/discomfort ,or have questions after 5pm and would like to be connected to the [facility] on-call team, please call this number and specify which Sports Medicine provider is treating you: [phone]

## 2023-08-02 NOTE — PROCEDURES
Large Joint Aspiration/Injection: bilateral knee    Date/Time: 8/2/2023 7:40 AM    Performed by: Higinio Lagos MD  Authorized by: Higinio Lagos MD    Consent Done?:  Yes (Verbal)  Indications:  Pain  Site marked: the procedure site was marked    Timeout: prior to procedure the correct patient, procedure, and site was verified    Prep: patient was prepped and draped in usual sterile fashion    Local anesthetic:  Bupivacaine 0.5% without epinephrine and lidocaine 1% without epinephrine    Details:  Needle Size:  22 G  Approach:  Anterolateral  Location:  Knee  Laterality:  Bilateral  Site:  Bilateral knee  Medications (Right):  40 mg triamcinolone acetonide 40 mg/mL  Medications (Left):  40 mg triamcinolone acetonide 40 mg/mL  Patient tolerance:  Patient tolerated the procedure well with no immediate complications

## 2023-08-02 NOTE — PROGRESS NOTES
Patient ID: Mali Mckinley  YOB: 1985  MRN: [id number]    Chief Complaint: Pain of the Left Knee and Pain of the Right Knee      History of Present Illness: aMli Mckinley is a right-hand dominant 38 y.o. female who presents today with bilateral knee pain, right greater than left.  Patient was last seen in clinic on 4/26/2023 when she received bilateral CSI.  She reports that these have seemed to help with her knee pain and she would like to repeat those today.  She rates her pain today at a 4/10.  She received Euflexxa series in February with the last being on 2/23/2023 and states that this seemed to help with pain also and is interested in repeating this series.  She was denied the Iovera procedure by insurance.  She reports when she has pain that she uses heat, which helps with her symptoms.  She has performed physical therapy in the past at [facility].   She is allergic to Toradol, Tramadol and Ibuprofen.     The patient is active in none.  Occupation: Unemployed      Past Medical History:   Past Medical History:   Diagnosis Date    Anxiety     Fibromyalgia     GERD (gastroesophageal reflux disease)     Hypertension     Neuropathy      Past Surgical History:   Procedure Laterality Date    ARTHROSCOPY OF KNEE Left 12/03/2019    Repair of Torn Meniscus    CHOLECYSTECTOMY      ROTATOR CUFF REPAIR Right 2006 and 2012    ROTATOR CUFF REPAIR Left 2007     Family History   Problem Relation Age of Onset    Hypertension Mother     Hypertension Paternal Grandmother     Hyperlipidemia Paternal Grandmother      Social History     Socioeconomic History    Marital status: Single   Occupational History    Occupation: Disabled   Tobacco Use    Smoking status: Never    Smokeless tobacco: Never   Substance and Sexual Activity    Alcohol use: Not Currently    Drug use: Never    Sexual activity: Not Currently     Medication List with Changes/Refills   Current Medications     AMITRIPTYLINE (ELAVIL) 25 MG TABLET    Take 50 mg by mouth every evening.     CHLORTHALIDONE (HYGROTEN) 25 MG TAB    Take 1 tablet (25 mg total) by mouth once daily.    DUPILUMAB (DUPIXENT PEN) 300 MG/2 ML PNIJ    every 14 (fourteen) days.    FLUOXETINE 20 MG CAPSULE    Take 1 capsule (20 mg total) by mouth once daily.    GABAPENTIN (NEURONTIN) 300 MG CAPSULE    Take 1 capsule (300 mg total) by mouth 3 (three) times daily.    HYDROCHLOROTHIAZIDE (HYDRODIURIL) 25 MG TABLET    Take 25 mg by mouth daily as needed.    HYDROXYCHLOROQUINE (PLAQUENIL) 200 MG TABLET    Take 1 tablet (200 mg total) by mouth 2 (two) times daily.    HYDROXYZINE HCL (ATARAX) 25 MG TABLET    Take 1 tablet (25 mg total) by mouth 2 (two) times a day.    KETOCONAZOLE (NIZORAL) 2 % CREAM    Apply topically once daily.    MAGNESIUM GLUCONATE 27.5 MG MAGNE- SIUM (500 MG) TAB    Take 500 mg by mouth once daily.    METHOCARBAMOL (ROBAXIN) 500 MG TAB    1 po bid prn muscle sapasms    NALTREXONE CAPSULE    Take 4.5 mg by mouth once daily.    NORGESTIMATE-ETHINYL ESTRADIOL (ORTHO-CYCLEN) 0.25-35 MG-MCG PER TABLET    Take 1 tablet by mouth once daily.    ONDANSETRON (ZOFRAN-ODT) 4 MG TBDL    Take 1 tablet (4 mg total) by mouth every 6 (six) hours as needed (nausea, alternate with promethazine).    PANTOPRAZOLE (PROTONIX) 40 MG TABLET    TAKE 1 TABLET BY MOUTH 2 TIMES DIALY    PROMETHAZINE (PHENERGAN) 25 MG TABLET    Take 1 tablet (25 mg total) by mouth every 6 (six) hours as needed for Nausea.    PROPRANOLOL (INDERAL LA) 80 MG 24 HR CAPSULE    TAKE 1 CAPSULE BY MOUTH ONCE DAILY    SALSALATE (DISALCID) 500 MG TAB    Take 500 mg by mouth 2 (two) times daily as needed.    TRIAMCINOLONE ACETONIDE 0.025% (KENALOG) 0.025 % CREAM    Apply topically 2 (two) times daily.    TRIAMCINOLONE ACETONIDE 0.1% (KENALOG) 0.1 % CREAM    2 (two) times daily as needed. Apply to affected area    TURMERIC ROOT EXTRACT 500 MG CAP    Take 500 mg by mouth 2 (two) times daily.      Review of patient's allergies indicates:   Allergen Reactions    Cyclobenzaprine     Cymbalta [duloxetine]     Ibuprofen     Oxycodone     Sulfa (sulfonamide antibiotics)     Toradol [ketorolac]     Tramadol        Physical Exam:   Body mass index is 35.87 kg/m².    GENERAL: Well appearing, in no acute distress.  HEAD: Normocephalic and atraumatic.  ENT: External ears and nose grossly normal.  EYES: EOMI bilaterally  PULMONARY: Respirations are grossly even and non-labored.  NEURO: Awake, alert, and oriented x 3.  SKIN: No obvious rashes appreciated.  PSYCH: Mood & affect are appropriate.    Detailed MSK exam:     Right knee exam:   -ROM: extension 0, flexion 120  -TTP: Medial joint line and Lateral joint line  -effusion: trace  -Patellar apprehension negative  -Yakelin test negative  -stable to varus and valgus stress tests  -Lachman test negative, anterior drawer test negative, posterior drawer test negative    Left knee exam:   -ROM: extension 0, flexion 120  -TTP: Medial joint line  -effusion: trace  -Patellar apprehension negative  -Yakelin test negative  -stable to varus and valgus stress tests  -Lachman test negative, anterior drawer test negative, posterior drawer test negative      Imaging:  MRI Knee Without Contrast Right  Narrative: EXAM:  MRI KNEE WITHOUT CONTRAST RIGHT    CLINICAL HISTORY:  Right knee pain.  Chronic and refractory to conservative treatment.    TECHNIQUE: Standard multiplanar, multisequence MR imaging protocol of the right knee was performed.    FINDINGS:  MENISCI:  Medial: No discernible tear.  Lateral: No discernible tear.    LIGAMENTS: Cruciate ligaments are intact.  Collateral ligaments are intact.    MUSCULOTENDINOUS: Extensor mechanism intact.    CARTILAGE:  Patellofemoral compartment: 8 mm CC dimension low-grade chondral erosion at the mid aspect of the trochlear groove with mild extension into the trochlear facets.  Otherwise intact cartilage.  Medial compartment:  Preserved.  Lateral compartment: Thin, 1 cm AP dimension high-grade chondral erosion/fissure at the mid/posterior weightbearing femoral condyle.  Remaining weightbearing articular cartilage demonstrates mild thinning and irregularity, greatest at the peripheral articulation.  Mild associated subchondral edema.    BONES: No acute fracture or suspicious osseous lesion.    FLUID: Tiny joint effusion with mild synovitis. No intra-articular body.  Tiny popliteal Baker's cyst.  Impression: 1.  No acute abnormality.  Intact cruciate ligaments and menisci.  2.  Small areas of focal chondrosis at the trochlea and lateral femoral condyle, as above.  Otherwise mild lateral tibiofemoral chondrosis with mild subchondral edema at the peripheral aspect of the tibial plateau.    Finalized on: 12/29/2022 9:43 AM By:  Tyrone Wise III, MD  [facility]# [id number]      2022-12-29 09:45:32.508    [facility]        Relevant imaging results were reviewed and interpreted by me and per my read shows chondrosis.  This was discussed with the patient and / or family today.     Assessment:  Mali Mckinley is a 38 y.o. female following up for chronic bilateral knee pain. She has done well with both steroid and gel injections and would like to repeat both.   Plan: Steroid injection given today (see separate procedure note for details). We discussed the proper protocols after the injection such as no submerging pools, baths tubs, or hot tubs for 24 hr.  Showering is okay today.  We also discussed that blood sugars can be elevated after an injection and asked patient to properly checked her sugars over the next few days and contact their PCP if there are any concerns.  We discussed red flags such as fevers, chills, red, warm, tender joint at the area of injection to please seek medical care immediately.   Prior auth for gel injections. Continue conservative management for pain.   Follow up for gel injections. All questions answered.     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 The patient has painful degenerative changes of the knee with failure of conservative treatments including lifestyle modifications and rehabilitation exercises.  Oral analgesis/NSAIDs have not adequately controlled symptoms and there is radiographic evidence of Kellgren Raimundo grade 2 or greater osteoarthritic changes, or in lack of radiographic evidence, there is arthroscopic or other evidence of chondrosis.     Electronically signed:  Higinio Lagos MD, MPH  08/02/2023  7:45 AM

## 2023-09-13 ENCOUNTER — OFFICE VISIT (OUTPATIENT)
Dept: SPORTS MEDICINE | Facility: CLINIC | Age: 38
End: 2023-09-13
Payer: MEDICAID

## 2023-09-13 VITALS — BODY MASS INDEX: 35.68 KG/M2 | HEIGHT: 64 IN | WEIGHT: 209 LBS

## 2023-09-13 DIAGNOSIS — M25.561 CHRONIC PAIN OF BOTH KNEES: Primary | ICD-10-CM

## 2023-09-13 DIAGNOSIS — M22.2X2 PATELLOFEMORAL PAIN SYNDROME OF BOTH KNEES: ICD-10-CM

## 2023-09-13 DIAGNOSIS — M23.8X1 CHONDRAL DEFECT OF CONDYLE OF RIGHT FEMUR: ICD-10-CM

## 2023-09-13 DIAGNOSIS — M22.2X1 PATELLOFEMORAL PAIN SYNDROME OF BOTH KNEES: ICD-10-CM

## 2023-09-13 DIAGNOSIS — M25.562 CHRONIC PAIN OF BOTH KNEES: Primary | ICD-10-CM

## 2023-09-13 DIAGNOSIS — G89.29 CHRONIC PAIN OF BOTH KNEES: Primary | ICD-10-CM

## 2023-09-13 PROCEDURE — 99999 PR PBB SHADOW E&M-EST. PATIENT-LVL IV: ICD-10-PCS | Mod: PBBFAC,,, | Performed by: STUDENT IN AN ORGANIZED HEALTH CARE EDUCATION/TRAINING PROGRAM

## 2023-09-13 PROCEDURE — 99499 NO LOS: ICD-10-PCS | Mod: S$PBB,,, | Performed by: STUDENT IN AN ORGANIZED HEALTH CARE EDUCATION/TRAINING PROGRAM

## 2023-09-13 PROCEDURE — 99999PBSHW PR PBB SHADOW TECHNICAL ONLY FILED TO HB: Mod: JZ,PBBFAC,,

## 2023-09-13 PROCEDURE — 99214 OFFICE O/P EST MOD 30 MIN: CPT | Mod: PBBFAC | Performed by: STUDENT IN AN ORGANIZED HEALTH CARE EDUCATION/TRAINING PROGRAM

## 2023-09-13 PROCEDURE — 20610 DRAIN/INJ JOINT/BURSA W/O US: CPT | Mod: 50,PBBFAC | Performed by: STUDENT IN AN ORGANIZED HEALTH CARE EDUCATION/TRAINING PROGRAM

## 2023-09-13 PROCEDURE — 99499 UNLISTED E&M SERVICE: CPT | Mod: S$PBB,,, | Performed by: STUDENT IN AN ORGANIZED HEALTH CARE EDUCATION/TRAINING PROGRAM

## 2023-09-13 PROCEDURE — 99999 PR PBB SHADOW E&M-EST. PATIENT-LVL IV: CPT | Mod: PBBFAC,,, | Performed by: STUDENT IN AN ORGANIZED HEALTH CARE EDUCATION/TRAINING PROGRAM

## 2023-09-13 PROCEDURE — 99999PBSHW PR PBB SHADOW TECHNICAL ONLY FILED TO HB: ICD-10-PCS | Mod: JZ,PBBFAC,,

## 2023-09-13 PROCEDURE — 20610 LARGE JOINT ASPIRATION/INJECTION: BILATERAL KNEE: ICD-10-PCS | Mod: 50,S$PBB,, | Performed by: STUDENT IN AN ORGANIZED HEALTH CARE EDUCATION/TRAINING PROGRAM

## 2023-09-13 RX ADMIN — Medication 20 MG: at 07:09

## 2023-09-13 NOTE — PATIENT INSTRUCTIONS
Assessment:  Mali Mckinley is a 38 y.o. female   Chief Complaint   Patient presents with    Left Knee - Pain    Right Knee - Pain       No diagnosis found.     Plan:  Bilateral Euflexxa injections  Today you received a gel injection. Unlike steroid injections, these gel injections are a lot thicker and can feel different at first.   It is normal to feel some soreness in the first few days because the gel is expanding that joint space.   It is also normal to experience some swelling in the first few days.   If you are feeling discomfort or having swelling, use ice and tylenol to control symptoms.   It is better to keep the knee joint moving so that the gel can get throughout the joint space.   Sometimes it can take a few weeks for the gel injection to start showing noticeable effects. This is normal.   These gel injections can be repeated every 6 months as needed.       Follow-up: 1 week or sooner if there are any problems between now and then.    Thank you for choosing [facility] and Dr. Higinio Lagos for your orthopedic & sports medicine care. It is our goal to provide you with exceptional care that will help keep you healthy, active, and get you back in the game.    Please do not hesitate to reach out to us via email, phone, or MyChart with any questions, concerns, or feedback.    If you felt that you received exemplary care today, please consider leaving us feedback on [employer] at:  [email or web address]    If you are experiencing pain/discomfort ,or have questions after 5pm and would like to be connected to the [facility] on-call team, please call this number and specify which Sports Medicine provider is treating you: [phone]

## 2023-09-13 NOTE — PROCEDURES
Large Joint Aspiration/Injection: bilateral knee    Date/Time: 9/13/2023 7:40 AM    Performed by: Higinio Lagos MD  Authorized by: Higinio Lagos MD    Consent Done?:  Yes (Verbal)  Indications:  Pain  Site marked: the procedure site was marked    Timeout: prior to procedure the correct patient, procedure, and site was verified    Prep: patient was prepped and draped in usual sterile fashion      Details:  Needle Size:  22 G  Approach:  Anterolateral  Location:  Knee  Laterality:  Bilateral  Site:  Bilateral knee  Medications (Right):  20 mg sodium hyaluronate (EUFLEXXA) 10 mg/mL(mw 2.4 -3.6 million)  Medications (Left):  20 mg sodium hyaluronate (EUFLEXXA) 10 mg/mL(mw 2.4 -3.6 million)  Patient tolerance:  Patient tolerated the procedure well with no immediate complications

## 2023-09-20 ENCOUNTER — OFFICE VISIT (OUTPATIENT)
Dept: SPORTS MEDICINE | Facility: CLINIC | Age: 38
End: 2023-09-20
Payer: MEDICAID

## 2023-09-20 VITALS — HEIGHT: 64 IN | BODY MASS INDEX: 35.68 KG/M2 | WEIGHT: 209 LBS

## 2023-09-20 DIAGNOSIS — M22.2X1 PATELLOFEMORAL PAIN SYNDROME OF BOTH KNEES: ICD-10-CM

## 2023-09-20 DIAGNOSIS — M22.2X2 PATELLOFEMORAL PAIN SYNDROME OF BOTH KNEES: ICD-10-CM

## 2023-09-20 DIAGNOSIS — M25.562 CHRONIC PAIN OF BOTH KNEES: Primary | ICD-10-CM

## 2023-09-20 DIAGNOSIS — M23.8X1 CHONDRAL DEFECT OF CONDYLE OF RIGHT FEMUR: ICD-10-CM

## 2023-09-20 DIAGNOSIS — M25.561 CHRONIC PAIN OF BOTH KNEES: Primary | ICD-10-CM

## 2023-09-20 DIAGNOSIS — G89.29 CHRONIC PAIN OF BOTH KNEES: Primary | ICD-10-CM

## 2023-09-20 PROCEDURE — 99999PBSHW PR PBB SHADOW TECHNICAL ONLY FILED TO HB: ICD-10-PCS | Mod: JZ,PBBFAC,,

## 2023-09-20 PROCEDURE — 20610 DRAIN/INJ JOINT/BURSA W/O US: CPT | Mod: 50,PBBFAC | Performed by: STUDENT IN AN ORGANIZED HEALTH CARE EDUCATION/TRAINING PROGRAM

## 2023-09-20 PROCEDURE — 99499 NO LOS: ICD-10-PCS | Mod: S$PBB,,, | Performed by: STUDENT IN AN ORGANIZED HEALTH CARE EDUCATION/TRAINING PROGRAM

## 2023-09-20 PROCEDURE — 99499 UNLISTED E&M SERVICE: CPT | Mod: S$PBB,,, | Performed by: STUDENT IN AN ORGANIZED HEALTH CARE EDUCATION/TRAINING PROGRAM

## 2023-09-20 PROCEDURE — 99999PBSHW PR PBB SHADOW TECHNICAL ONLY FILED TO HB: Mod: JZ,PBBFAC,,

## 2023-09-20 PROCEDURE — 99999 PR PBB SHADOW E&M-EST. PATIENT-LVL IV: CPT | Mod: PBBFAC,,, | Performed by: STUDENT IN AN ORGANIZED HEALTH CARE EDUCATION/TRAINING PROGRAM

## 2023-09-20 PROCEDURE — 99999 PR PBB SHADOW E&M-EST. PATIENT-LVL IV: ICD-10-PCS | Mod: PBBFAC,,, | Performed by: STUDENT IN AN ORGANIZED HEALTH CARE EDUCATION/TRAINING PROGRAM

## 2023-09-20 PROCEDURE — 99214 OFFICE O/P EST MOD 30 MIN: CPT | Mod: PBBFAC,25 | Performed by: STUDENT IN AN ORGANIZED HEALTH CARE EDUCATION/TRAINING PROGRAM

## 2023-09-20 PROCEDURE — 20610 LARGE JOINT ASPIRATION/INJECTION: BILATERAL KNEE: ICD-10-PCS | Mod: 50,S$PBB,, | Performed by: STUDENT IN AN ORGANIZED HEALTH CARE EDUCATION/TRAINING PROGRAM

## 2023-09-20 RX ADMIN — Medication 20 MG: at 07:09

## 2023-09-20 NOTE — PROCEDURES
Large Joint Aspiration/Injection: bilateral knee    Date/Time: 9/20/2023 7:40 AM    Performed by: Higinio Lagos MD  Authorized by: Higinio Lagos MD    Consent Done?:  Yes (Verbal)  Indications:  Arthritis and pain  Site marked: the procedure site was marked    Timeout: prior to procedure the correct patient, procedure, and site was verified    Prep: patient was prepped and draped in usual sterile fashion      Details:  Needle Size:  22 G  Approach:  Anterolateral  Location:  Knee  Laterality:  Bilateral  Site:  Bilateral knee  Medications (Right):  20 mg sodium hyaluronate (EUFLEXXA) 10 mg/mL(mw 2.4 -3.6 million)  Medications (Left):  20 mg sodium hyaluronate (EUFLEXXA) 10 mg/mL(mw 2.4 -3.6 million)  Patient tolerance:  Patient tolerated the procedure well with no immediate complications

## 2023-09-20 NOTE — PATIENT INSTRUCTIONS
Assessment:  Mali Mckinley is a 38 y.o. female   Chief Complaint   Patient presents with    Left Knee - Pain    Right Knee - Pain       No diagnosis found.     Plan:  Bilateral Euflexxa injections  Today you received a gel injection. Unlike steroid injections, these gel injections are a lot thicker and can feel different at first.   It is normal to feel some soreness in the first few days because the gel is expanding that joint space.   It is also normal to experience some swelling in the first few days.   If you are feeling discomfort or having swelling, use ice and tylenol to control symptoms.   It is better to keep the knee joint moving so that the gel can get throughout the joint space.   Sometimes it can take a few weeks for the gel injection to start showing noticeable effects. This is normal.   These gel injections can be repeated every 6 months as needed.       Follow-up: 1 week  or sooner if there are any problems between now and then.    Thank you for choosing [facility] and Dr. Higinio Lagos for your orthopedic & sports medicine care. It is our goal to provide you with exceptional care that will help keep you healthy, active, and get you back in the game.    Please do not hesitate to reach out to us via email, phone, or MyChart with any questions, concerns, or feedback.    If you felt that you received exemplary care today, please consider leaving us feedback on [employer] at:  [email or web address]    If you are experiencing pain/discomfort ,or have questions after 5pm and would like to be connected to the [facility] on-call team, please call this number and specify which Sports Medicine provider is treating you: [phone]

## 2023-09-27 ENCOUNTER — OFFICE VISIT (OUTPATIENT)
Dept: SPORTS MEDICINE | Facility: CLINIC | Age: 38
End: 2023-09-27
Payer: MEDICAID

## 2023-09-27 VITALS — WEIGHT: 213.63 LBS | BODY MASS INDEX: 36.47 KG/M2 | HEIGHT: 64 IN

## 2023-09-27 DIAGNOSIS — M25.562 CHRONIC PAIN OF BOTH KNEES: Primary | ICD-10-CM

## 2023-09-27 DIAGNOSIS — M25.561 CHRONIC PAIN OF BOTH KNEES: Primary | ICD-10-CM

## 2023-09-27 DIAGNOSIS — M22.2X2 PATELLOFEMORAL PAIN SYNDROME OF BOTH KNEES: ICD-10-CM

## 2023-09-27 DIAGNOSIS — M22.2X1 PATELLOFEMORAL PAIN SYNDROME OF BOTH KNEES: ICD-10-CM

## 2023-09-27 DIAGNOSIS — M23.8X1 CHONDRAL DEFECT OF CONDYLE OF RIGHT FEMUR: ICD-10-CM

## 2023-09-27 DIAGNOSIS — G89.29 CHRONIC PAIN OF BOTH KNEES: Primary | ICD-10-CM

## 2023-09-27 PROCEDURE — 99999 PR PBB SHADOW E&M-EST. PATIENT-LVL IV: ICD-10-PCS | Mod: PBBFAC,,, | Performed by: STUDENT IN AN ORGANIZED HEALTH CARE EDUCATION/TRAINING PROGRAM

## 2023-09-27 PROCEDURE — 99499 NO LOS: ICD-10-PCS | Mod: S$PBB,,, | Performed by: STUDENT IN AN ORGANIZED HEALTH CARE EDUCATION/TRAINING PROGRAM

## 2023-09-27 PROCEDURE — 99499 UNLISTED E&M SERVICE: CPT | Mod: S$PBB,,, | Performed by: STUDENT IN AN ORGANIZED HEALTH CARE EDUCATION/TRAINING PROGRAM

## 2023-09-27 PROCEDURE — 99214 OFFICE O/P EST MOD 30 MIN: CPT | Mod: PBBFAC,25 | Performed by: STUDENT IN AN ORGANIZED HEALTH CARE EDUCATION/TRAINING PROGRAM

## 2023-09-27 PROCEDURE — 99999PBSHW PR PBB SHADOW TECHNICAL ONLY FILED TO HB: ICD-10-PCS | Mod: JZ,PBBFAC,,

## 2023-09-27 PROCEDURE — 99999PBSHW PR PBB SHADOW TECHNICAL ONLY FILED TO HB: Mod: JZ,PBBFAC,,

## 2023-09-27 PROCEDURE — 99999 PR PBB SHADOW E&M-EST. PATIENT-LVL IV: CPT | Mod: PBBFAC,,, | Performed by: STUDENT IN AN ORGANIZED HEALTH CARE EDUCATION/TRAINING PROGRAM

## 2023-09-27 PROCEDURE — 20610 DRAIN/INJ JOINT/BURSA W/O US: CPT | Mod: 50,PBBFAC | Performed by: STUDENT IN AN ORGANIZED HEALTH CARE EDUCATION/TRAINING PROGRAM

## 2023-09-27 PROCEDURE — 20610 LARGE JOINT ASPIRATION/INJECTION: BILATERAL KNEE: ICD-10-PCS | Mod: 50,S$PBB,, | Performed by: STUDENT IN AN ORGANIZED HEALTH CARE EDUCATION/TRAINING PROGRAM

## 2023-09-27 RX ADMIN — Medication 20 MG: at 08:09

## 2023-09-27 NOTE — PATIENT INSTRUCTIONS
Assessment:  Mali Mckinley is a 38 y.o. female No chief complaint on file.      Encounter Diagnoses   Name Primary?    Chronic pain of both knees Yes    Patellofemoral pain syndrome of both knees         Plan:  Bilateral Euflexxa injections to knees  Today you received a gel injection. Unlike steroid injections, these gel injections are a lot thicker and can feel different at first.   It is normal to feel some soreness in the first few days because the gel is expanding that joint space.   It is also normal to experience some swelling in the first few days.   If you are feeling discomfort or having swelling, use ice and tylenol to control symptoms.   It is better to keep the knee joint moving so that the gel can get throughout the joint space.   Sometimes it can take a few weeks for the gel injection to start showing noticeable effects. This is normal.   These gel injections can be repeated every 6 months as needed.   Follow up as needed    Follow-up: as needed or sooner if there are any problems between now and then.    Thank you for choosing [facility] and Dr. Higinio Lagos for your orthopedic & sports medicine care. It is our goal to provide you with exceptional care that will help keep you healthy, active, and get you back in the game.    Please do not hesitate to reach out to us via email, phone, or MyChart with any questions, concerns, or feedback.    If you felt that you received exemplary care today, please consider leaving us feedback on [employer] at:  [email or web address]    If you are experiencing pain/discomfort ,or have questions after 5pm and would like to be connected to the [facility] on-call team, please call this number and specify which Sports Medicine provider is treating you: [phone]

## 2023-09-27 NOTE — PROCEDURES
Large Joint Aspiration/Injection: bilateral knee    Date/Time: 9/27/2023 8:20 AM    Performed by: Higinio Lagos MD  Authorized by: Higinio Lagos MD    Consent Done?:  Yes (Verbal)  Indications:  Arthritis and pain  Site marked: the procedure site was marked    Timeout: prior to procedure the correct patient, procedure, and site was verified    Prep: patient was prepped and draped in usual sterile fashion      Details:  Needle Size:  22 G  Approach:  Anterolateral  Location:  Knee  Laterality:  Bilateral  Site:  Bilateral knee  Medications (Right):  20 mg sodium hyaluronate (EUFLEXXA) 10 mg/mL(mw 2.4 -3.6 million)  Medications (Left):  20 mg sodium hyaluronate (EUFLEXXA) 10 mg/mL(mw 2.4 -3.6 million)  Patient tolerance:  Patient tolerated the procedure well with no immediate complications

## 2023-12-19 ENCOUNTER — TELEPHONE (OUTPATIENT)
Dept: SPORTS MEDICINE | Facility: CLINIC | Age: 38
End: 2023-12-19
Payer: MEDICAID

## 2023-12-19 NOTE — TELEPHONE ENCOUNTER
Returned patient call and LVM    ----- Message from Brina Novak sent at 12/19/2023 11:00 AM CST -----  Contact: Mali  Type:  Sooner Apoointment Request    Caller is requesting a sooner appointment. Caller will not accept being placed on the waitlist and is requesting a message be sent to doctor.  Name of Caller:Mali  When is the first available appointment?unknown  Symptoms:Steroid injection in both knees   Would the patient rather a call back or a response via [employer]? call  Best Call Back Number:[phone]   Additional Information: Patient request to speak with staff member Jie regarding scheduling. Please give patient a call back to assist.  Thank you,  GH

## 2023-12-20 ENCOUNTER — TELEPHONE (OUTPATIENT)
Dept: SPORTS MEDICINE | Facility: CLINIC | Age: 38
End: 2023-12-20
Payer: MEDICAID

## 2023-12-20 NOTE — TELEPHONE ENCOUNTER
Contacted patient and scheduled appt for bilateral CSI for Wednesday, 12/27 at 7:40 am.  Patient verbalized understanding of appt date, time and location.   ----- Message from Mari Ramirez sent at 12/20/2023  6:53 AM CST -----  Regarding: Medical Advice  Contact: Mali  .Type:  Needs Medical Advice    Who Called: Mali   Symptoms (please be specific):    How long has patient had these symptoms:    Pharmacy name and phone #:    Would the patient rather a call back or a response via [facility]? call  Best Call Back Number: [phone]  Additional Information: Mali is requesting a callback from the nurse today please.

## 2023-12-27 ENCOUNTER — OFFICE VISIT (OUTPATIENT)
Dept: SPORTS MEDICINE | Facility: CLINIC | Age: 38
End: 2023-12-27
Payer: MEDICAID

## 2023-12-27 VITALS — BODY MASS INDEX: 38.41 KG/M2 | HEIGHT: 64 IN | WEIGHT: 225 LBS

## 2023-12-27 DIAGNOSIS — M25.561 CHRONIC PAIN OF BOTH KNEES: ICD-10-CM

## 2023-12-27 DIAGNOSIS — M25.562 CHRONIC PAIN OF BOTH KNEES: ICD-10-CM

## 2023-12-27 DIAGNOSIS — M22.2X2 PATELLOFEMORAL PAIN SYNDROME OF BOTH KNEES: Primary | ICD-10-CM

## 2023-12-27 DIAGNOSIS — M22.2X1 PATELLOFEMORAL PAIN SYNDROME OF BOTH KNEES: Primary | ICD-10-CM

## 2023-12-27 DIAGNOSIS — M23.8X1 CHONDRAL DEFECT OF CONDYLE OF RIGHT FEMUR: ICD-10-CM

## 2023-12-27 DIAGNOSIS — G89.29 CHRONIC PAIN OF BOTH KNEES: ICD-10-CM

## 2023-12-27 PROCEDURE — 1159F MED LIST DOCD IN RCRD: CPT | Mod: CPTII,,, | Performed by: STUDENT IN AN ORGANIZED HEALTH CARE EDUCATION/TRAINING PROGRAM

## 2023-12-27 PROCEDURE — 99214 OFFICE O/P EST MOD 30 MIN: CPT | Mod: PBBFAC,25 | Performed by: STUDENT IN AN ORGANIZED HEALTH CARE EDUCATION/TRAINING PROGRAM

## 2023-12-27 PROCEDURE — 3044F HG A1C LEVEL LT 7.0%: CPT | Mod: CPTII,,, | Performed by: STUDENT IN AN ORGANIZED HEALTH CARE EDUCATION/TRAINING PROGRAM

## 2023-12-27 PROCEDURE — 3008F PR BODY MASS INDEX (BMI) DOCUMENTED: ICD-10-PCS | Mod: CPTII,,, | Performed by: STUDENT IN AN ORGANIZED HEALTH CARE EDUCATION/TRAINING PROGRAM

## 2023-12-27 PROCEDURE — 3061F PR NEG MICROALBUMINURIA RESULT DOCUMENTED/REVIEW: ICD-10-PCS | Mod: CPTII,,, | Performed by: STUDENT IN AN ORGANIZED HEALTH CARE EDUCATION/TRAINING PROGRAM

## 2023-12-27 PROCEDURE — 99999PBSHW PR PBB SHADOW TECHNICAL ONLY FILED TO HB: ICD-10-PCS | Mod: PBBFAC,,,

## 2023-12-27 PROCEDURE — 99999PBSHW PR PBB SHADOW TECHNICAL ONLY FILED TO HB: Mod: PBBFAC,,,

## 2023-12-27 PROCEDURE — 99214 PR OFFICE/OUTPT VISIT, EST, LEVL IV, 30-39 MIN: ICD-10-PCS | Mod: 25,S$PBB,, | Performed by: STUDENT IN AN ORGANIZED HEALTH CARE EDUCATION/TRAINING PROGRAM

## 2023-12-27 PROCEDURE — 99999 PR PBB SHADOW E&M-EST. PATIENT-LVL IV: ICD-10-PCS | Mod: PBBFAC,,, | Performed by: STUDENT IN AN ORGANIZED HEALTH CARE EDUCATION/TRAINING PROGRAM

## 2023-12-27 PROCEDURE — 3008F BODY MASS INDEX DOCD: CPT | Mod: CPTII,,, | Performed by: STUDENT IN AN ORGANIZED HEALTH CARE EDUCATION/TRAINING PROGRAM

## 2023-12-27 PROCEDURE — 3044F PR MOST RECENT HEMOGLOBIN A1C LEVEL <7.0%: ICD-10-PCS | Mod: CPTII,,, | Performed by: STUDENT IN AN ORGANIZED HEALTH CARE EDUCATION/TRAINING PROGRAM

## 2023-12-27 PROCEDURE — 3066F PR DOCUMENTATION OF TREATMENT FOR NEPHROPATHY: ICD-10-PCS | Mod: CPTII,,, | Performed by: STUDENT IN AN ORGANIZED HEALTH CARE EDUCATION/TRAINING PROGRAM

## 2023-12-27 PROCEDURE — 99999 PR PBB SHADOW E&M-EST. PATIENT-LVL IV: CPT | Mod: PBBFAC,,, | Performed by: STUDENT IN AN ORGANIZED HEALTH CARE EDUCATION/TRAINING PROGRAM

## 2023-12-27 PROCEDURE — 20611 DRAIN/INJ JOINT/BURSA W/US: CPT | Mod: 50,PBBFAC | Performed by: STUDENT IN AN ORGANIZED HEALTH CARE EDUCATION/TRAINING PROGRAM

## 2023-12-27 PROCEDURE — 3066F NEPHROPATHY DOC TX: CPT | Mod: CPTII,,, | Performed by: STUDENT IN AN ORGANIZED HEALTH CARE EDUCATION/TRAINING PROGRAM

## 2023-12-27 PROCEDURE — 1160F PR REVIEW ALL MEDS BY PRESCRIBER/CLIN PHARMACIST DOCUMENTED: ICD-10-PCS | Mod: CPTII,,, | Performed by: STUDENT IN AN ORGANIZED HEALTH CARE EDUCATION/TRAINING PROGRAM

## 2023-12-27 PROCEDURE — 3061F NEG MICROALBUMINURIA REV: CPT | Mod: CPTII,,, | Performed by: STUDENT IN AN ORGANIZED HEALTH CARE EDUCATION/TRAINING PROGRAM

## 2023-12-27 PROCEDURE — 1160F RVW MEDS BY RX/DR IN RCRD: CPT | Mod: CPTII,,, | Performed by: STUDENT IN AN ORGANIZED HEALTH CARE EDUCATION/TRAINING PROGRAM

## 2023-12-27 PROCEDURE — 20611 LARGE JOINT ASPIRATION/INJECTION: BILATERAL SUPRA PATELLAR BURSA: ICD-10-PCS | Mod: 50,S$PBB,, | Performed by: STUDENT IN AN ORGANIZED HEALTH CARE EDUCATION/TRAINING PROGRAM

## 2023-12-27 PROCEDURE — 99214 OFFICE O/P EST MOD 30 MIN: CPT | Mod: 25,S$PBB,, | Performed by: STUDENT IN AN ORGANIZED HEALTH CARE EDUCATION/TRAINING PROGRAM

## 2023-12-27 PROCEDURE — 1159F PR MEDICATION LIST DOCUMENTED IN MEDICAL RECORD: ICD-10-PCS | Mod: CPTII,,, | Performed by: STUDENT IN AN ORGANIZED HEALTH CARE EDUCATION/TRAINING PROGRAM

## 2023-12-27 RX ORDER — TRIAMCINOLONE ACETONIDE 40 MG/ML
40 INJECTION, SUSPENSION INTRA-ARTICULAR; INTRAMUSCULAR
Status: DISCONTINUED | OUTPATIENT
Start: 2023-12-27 | End: 2023-12-27 | Stop reason: HOSPADM

## 2023-12-27 RX ADMIN — TRIAMCINOLONE ACETONIDE 40 MG: 200 INJECTION, SUSPENSION INTRA-ARTICULAR; INTRAMUSCULAR at 07:12

## 2023-12-27 NOTE — PROGRESS NOTES
Patient ID: Mali Mckinley  YOB: 1985  MRN: [id number]    Chief Complaint: Pain of the Left Knee and Pain of the Right Knee      History of Present Illness: Mali Mckinley is a right-hand dominant 38 y.o. female who presents today with bilateral knee pain.  Patient was last seen in clinic on 9/27/2023 where she received cortisone injections to both knees.  Patient states that both knees are a 7/10 today and that the pain is an achy pain. She did receive the 3 series Euflexxa gel injections in early September.     Interval 8/2/2023 History of Present Illness: Mali Mckinley is a right-hand dominant 38 y.o. female who presents today with bilateral knee pain, right greater than left.  Patient was last seen in clinic on 4/26/2023 when she received bilateral CSI.  She reports that these have seemed to help with her knee pain and she would like to repeat those today.  She rates her pain today at a 4/10.  She received Euflexxa series in February with the last being on 2/23/2023 and states that this seemed to help with pain also and is interested in repeating this series.  She was denied the Iovera procedure by insurance.  She reports when she has pain that she uses heat, which helps with her symptoms.  She has performed physical therapy in the past at [facility].   She is allergic to Toradol, Tramadol and Ibuprofen.       The patient is active in none.  Occupation: Unemployed      Past Medical History:   Past Medical History:   Diagnosis Date    Anxiety     CHF (congestive heart failure)     Fibromyalgia     GERD (gastroesophageal reflux disease)     Hypertension     Neuropathy      Past Surgical History:   Procedure Laterality Date    ARTHROSCOPY OF KNEE Left 12/03/2019    Repair of Torn Meniscus    CHOLECYSTECTOMY      ROTATOR CUFF REPAIR Right 2006 and 2012    ROTATOR CUFF REPAIR Left 2007     Family History   Problem Relation Age of Onset     Hypertension Mother     Hypertension Paternal Grandmother     Hyperlipidemia Paternal Grandmother      Social History     Socioeconomic History    Marital status: Single   Occupational History    Occupation: Disabled   Tobacco Use    Smoking status: Never    Smokeless tobacco: Never   Substance and Sexual Activity    Alcohol use: Not Currently    Drug use: Never    Sexual activity: Not Currently     Medication List with Changes/Refills   Current Medications    AMITRIPTYLINE (ELAVIL) 25 MG TABLET    Take 2 tablets (50 mg total) by mouth every evening.    BUMETANIDE (BUMEX) 1 MG TABLET    Take 1 tablet by mouth every morning.    CHLORTHALIDONE (HYGROTEN) 25 MG TAB    Take 1 tablet (25 mg total) by mouth once daily.    DUPILUMAB (DUPIXENT PEN) 300 MG/2 ML PNIJ    every 14 (fourteen) days.    FLUOXETINE 20 MG CAPSULE    Take 1 capsule (20 mg total) by mouth once daily.    FLUTICASONE PROPIONATE (FLONASE) 50 MCG/ACTUATION NASAL SPRAY    100 mcg by Nasal route daily as needed.    GABAPENTIN (NEURONTIN) 300 MG CAPSULE    TAKE 1 CAPSULE BY MOUTHH 3 TIMES DAILY    HYDROCHLOROTHIAZIDE (HYDRODIURIL) 25 MG TABLET    Take 1 tablet (25 mg total) by mouth daily as needed.    HYDROCORTISONE 2.5 % CREAM    Apply topically daily as needed.    HYDROXYCHLOROQUINE (PLAQUENIL) 200 MG TABLET    Take 1 tablet (200 mg total) by mouth 2 (two) times daily.    HYDROXYZINE HCL (ATARAX) 25 MG TABLET    Take 1 tablet (25 mg total) by mouth 2 (two) times a day.    KETOCONAZOLE (NIZORAL) 2 % CREAM    Apply topically once daily.    MAGNESIUM GLUCONATE 27.5 MG MAGNE- SIUM (500 MG) TAB    Take 500 mg by mouth once daily.    METHOCARBAMOL (ROBAXIN) 500 MG TAB    TAKE 1 TABLET BY MOUTH TWO TIMES DAILY AS NEEDED FOR MUSCLE SPASMS    NALTREXONE CAPSULE    Take 4.5 mg by mouth once daily.    NORGESTIMATE-ETHINYL ESTRADIOL (ORTHO-CYCLEN) 0.25-35 MG-MCG PER TABLET    Take 1 tablet by mouth once daily.    ONDANSETRON (ZOFRAN-ODT) 4 MG TBDL    Take 1 tablet (4  mg total) by mouth every 6 (six) hours as needed (nausea, alternate with promethazine).    PANTOPRAZOLE (PROTONIX) 40 MG TABLET    TAKE 1 TABLET BY MOUTH 2 TIMES DIALY    POTASSIUM CHLORIDE SA (K-DUR,KLOR-CON) 20 MEQ TABLET    Take 1 tablet (20 mEq total) by mouth once daily. 2 BID    PROMETHAZINE (PHENERGAN) 25 MG TABLET    Take 1 tablet (25 mg total) by mouth every 6 (six) hours as needed for Nausea.    PROPRANOLOL (INDERAL LA) 80 MG 24 HR CAPSULE    Take 1 capsule (80 mg total) by mouth once daily.    SALSALATE (DISALCID) 500 MG TAB    Take 500 mg by mouth 2 (two) times daily as needed.    TRIAMCINOLONE ACETONIDE 0.1% (KENALOG) 0.1 % CREAM    2 (two) times daily as needed. Apply to affected area    TURMERIC ROOT EXTRACT 500 MG CAP    Take 500 mg by mouth 2 (two) times daily.     Review of patient's allergies indicates:   Allergen Reactions    Cyclobenzaprine     Cymbalta [duloxetine]     Ibuprofen     Oxycodone     Sulfa (sulfonamide antibiotics)     Toradol [ketorolac]     Tramadol        Physical Exam:   Body mass index is 38.62 kg/m².    GENERAL: Well appearing, in no acute distress.  HEAD: Normocephalic and atraumatic.  ENT: External ears and nose grossly normal.  EYES: EOMI bilaterally  PULMONARY: Respirations are grossly even and non-labored.  NEURO: Awake, alert, and oriented x 3.  SKIN: No obvious rashes appreciated.  PSYCH: Mood & affect are appropriate.    Detailed MSK exam:     Right knee exam:   -ROM: extension 0, flexion 120  -TTP: Medial joint line and Lateral joint line  -effusion: trace  -Patellar apprehension negative  -Yakelin test negative  -stable to varus and valgus stress tests  -Lachman test negative, anterior drawer test negative, posterior drawer test negative     Left knee exam:   -ROM: extension 0, flexion 120  -TTP: Medial joint line and lateral joint line  -effusion: trace  -Patellar apprehension negative  -Yakelin test negative  -stable to varus and valgus stress tests  -Lachman  test negative, anterior drawer test negative, posterior drawer test negative    Imaging:  MRI Knee Without Contrast Right  Narrative: EXAM:  MRI KNEE WITHOUT CONTRAST RIGHT    CLINICAL HISTORY:  Right knee pain.  Chronic and refractory to conservative treatment.    TECHNIQUE: Standard multiplanar, multisequence MR imaging protocol of the right knee was performed.    FINDINGS:  MENISCI:  Medial: No discernible tear.  Lateral: No discernible tear.    LIGAMENTS: Cruciate ligaments are intact.  Collateral ligaments are intact.    MUSCULOTENDINOUS: Extensor mechanism intact.    CARTILAGE:  Patellofemoral compartment: 8 mm CC dimension low-grade chondral erosion at the mid aspect of the trochlear groove with mild extension into the trochlear facets.  Otherwise intact cartilage.  Medial compartment: Preserved.  Lateral compartment: Thin, 1 cm AP dimension high-grade chondral erosion/fissure at the mid/posterior weightbearing femoral condyle.  Remaining weightbearing articular cartilage demonstrates mild thinning and irregularity, greatest at the peripheral articulation.  Mild associated subchondral edema.    BONES: No acute fracture or suspicious osseous lesion.    FLUID: Tiny joint effusion with mild synovitis. No intra-articular body.  Tiny popliteal Baker's cyst.  Impression: 1.  No acute abnormality.  Intact cruciate ligaments and menisci.  2.  Small areas of focal chondrosis at the trochlea and lateral femoral condyle, as above.  Otherwise mild lateral tibiofemoral chondrosis with mild subchondral edema at the peripheral aspect of the tibial plateau.    Finalized on: 12/29/2022 9:43 AM By:  Tyrone Wise III, MD  [facility]# [id number]      2022-12-29 09:45:[id number]    [facility]        Relevant imaging results were reviewed and interpreted by me and per my read shows chondrosis.  This was discussed with the patient and / or family today.     Assessment:  Mali Mckinley is a 38 y.o. female following up for chronic bilateral  knee pain. Gel injections worked well but starting to have pain again and interested in repeating steroid injections today.   Plan: Steroid injection given today (see separate procedure note for details). We discussed the proper protocols after the injection such as no submerging pools, baths tubs, or hot tubs for 24 hr.  Showering is okay today.  We also discussed that blood sugars can be elevated after an injection and asked patient to properly checked her sugars over the next few days and contact their PCP if there are any concerns.  We discussed red flags such as fevers, chills, red, warm, tender joint at the area of injection to please seek medical care immediately.   Continue conservative management.   Follow up 3 months. All questions answered.     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 Images were saved and stored for documentation. The appropriate structures were visualized. Dynamic visualization of the needle was continuous throughout the procedures and maintained good position.      MEDICAL NECESSITY FOR VISCOSUPPLEMENTATION: After thorough evaluation of the patient, I have determined that visco-supplementation is medically necessary. The patient has painful degenerative changes of the knee with failure of conservative treatments including lifestyle modifications and rehabilitation exercises.  Oral analgesis/NSAIDs have not adequately controlled symptoms and there is radiographic evidence of Kellgren Raimundo grade 2 or greater osteoarthritic changes, or in lack of radiographic evidence, there is  arthroscopic or other evidence of chondrosis.     Electronically signed:  Higinio Lagos MD, MPH  12/27/2023  8:14 AM

## 2023-12-27 NOTE — PROCEDURES
Sports Medicine US - Guidance for Needle Placement    Date/Time: 12/27/2023 7:40 AM    Performed by: Higinio Lagos MD  Authorized by: Higinio Lagos MD  Preparation: Patient was prepped and draped in the usual sterile fashion.  Local anesthesia used: no    Anesthesia:  Local anesthesia used: no    Sedation:  Patient sedated: no    Patient tolerance: patient tolerated the procedure well with no immediate complications  Comments: Ultrasound guidance was used for needle localization. Images were saved and stored for documentation. The appropriate structures were visualized. Dynamic visualization of the needle was continuous throughout the procedures and maintained good position.

## 2023-12-27 NOTE — PROCEDURES
Large Joint Aspiration/Injection: bilateral supra patellar bursa    Date/Time: 12/27/2023 7:40 AM    Performed by: Higinio Lagos MD  Authorized by: Higinio Lagos MD    Consent Done?:  Yes (Verbal)  Indications:  Arthritis and pain  Site marked: the procedure site was marked    Timeout: prior to procedure the correct patient, procedure, and site was verified    Prep: patient was prepped and draped in usual sterile fashion    Local anesthetic:  Bupivacaine 0.5% without epinephrine and lidocaine 1% without epinephrine    Details:  Needle Size:  21 G  Ultrasonic Guidance for needle placement?: Yes    Images are saved and documented.  Approach:  Anterolateral  Location:  Knee  Laterality:  Bilateral  Site:  Bilateral supra patellar bursa  Medications (Right):  40 mg triamcinolone acetonide 40 mg/mL  Medications (Left):  40 mg triamcinolone acetonide 40 mg/mL  Patient tolerance:  Patient tolerated the procedure well with no immediate complications     Ultrasound guidance was used for needle localization. Images were saved and stored for documentation. The appropriate structures were visualized. Dynamic visualization of the needle was continuous throughout the procedures and maintained good position.

## 2023-12-27 NOTE — PATIENT INSTRUCTIONS
Assessment:  Mali Mckinley is a 38 y.o. female   Chief Complaint   Patient presents with    Left Knee - Pain    Right Knee - Pain       Encounter Diagnosis   Name Primary?    Patellofemoral pain syndrome of both knees Yes        Plan:  Ultrasound guided bilateral cortisone injections to knees  We discussed the proper protocols after the injection such as no submerging pools, baths tubs, or hot tubs for 24 hr.  Showering is okay today.  We also discussed that blood sugars can be elevated after an injection and asked patient to properly checked her sugars over the next few days and contact their PCP if there are any concerns.  We discussed red flags such as fevers, chills, red, warm, tender joint at the area of injection to please seek medical care immediately.    Follow up as needed    Follow-up: as needed.    Thank you for choosing [facility] and Dr. Higinio Lagos for your orthopedic & sports medicine care. It is our goal to provide you with exceptional care that will help keep you healthy, active, and get you back in the game.    Please do not hesitate to reach out to us via email, phone, or MyChart with any questions, concerns, or feedback.    If you felt that you received exemplary care today, please consider leaving us feedback on [employer] at:  [email or web address]    If you are experiencing pain/discomfort ,or have questions after 5pm and would like to be connected to the [facility] on-call team, please call this number and specify which Sports Medicine provider is treating you: [phone]

## 2024-02-05 PROBLEM — I50.32 CHRONIC DIASTOLIC CHF (CONGESTIVE HEART FAILURE): Status: ACTIVE | Noted: 2023-11-20

## 2024-05-15 ENCOUNTER — TELEPHONE (OUTPATIENT)
Dept: SPORTS MEDICINE | Facility: CLINIC | Age: 39
End: 2024-05-15
Payer: MEDICAID

## 2024-05-15 NOTE — TELEPHONE ENCOUNTER
Spoke with patient and scheduled appt for follow up to discuss gel injections.   ----- Message from Amanda Ibarra sent at 5/15/2024 11:07 AM CDT -----  Contact: Mali Samson is calling to speak to the nurse regarding her gel injections, please give her a call at  [phone]     Thanks  LJ

## 2024-05-22 ENCOUNTER — OFFICE VISIT (OUTPATIENT)
Dept: SPORTS MEDICINE | Facility: CLINIC | Age: 39
End: 2024-05-22
Payer: MEDICAID

## 2024-05-22 VITALS — WEIGHT: 220 LBS | HEIGHT: 64 IN | BODY MASS INDEX: 37.56 KG/M2

## 2024-05-22 DIAGNOSIS — M25.562 CHRONIC PAIN OF BOTH KNEES: ICD-10-CM

## 2024-05-22 DIAGNOSIS — M25.561 CHRONIC PAIN OF BOTH KNEES: ICD-10-CM

## 2024-05-22 DIAGNOSIS — M23.8X1 CHONDRAL DEFECT OF CONDYLE OF RIGHT FEMUR: ICD-10-CM

## 2024-05-22 DIAGNOSIS — M22.2X2 PATELLOFEMORAL PAIN SYNDROME OF BOTH KNEES: Primary | ICD-10-CM

## 2024-05-22 DIAGNOSIS — G89.29 CHRONIC PAIN OF BOTH KNEES: ICD-10-CM

## 2024-05-22 DIAGNOSIS — M22.2X1 PATELLOFEMORAL PAIN SYNDROME OF BOTH KNEES: Primary | ICD-10-CM

## 2024-05-22 PROCEDURE — 1160F RVW MEDS BY RX/DR IN RCRD: CPT | Mod: CPTII,,, | Performed by: STUDENT IN AN ORGANIZED HEALTH CARE EDUCATION/TRAINING PROGRAM

## 2024-05-22 PROCEDURE — 3008F BODY MASS INDEX DOCD: CPT | Mod: CPTII,,, | Performed by: STUDENT IN AN ORGANIZED HEALTH CARE EDUCATION/TRAINING PROGRAM

## 2024-05-22 PROCEDURE — 99214 OFFICE O/P EST MOD 30 MIN: CPT | Mod: PBBFAC | Performed by: STUDENT IN AN ORGANIZED HEALTH CARE EDUCATION/TRAINING PROGRAM

## 2024-05-22 PROCEDURE — 99999PBSHW PR PBB SHADOW TECHNICAL ONLY FILED TO HB: Mod: PBBFAC,,,

## 2024-05-22 PROCEDURE — 1159F MED LIST DOCD IN RCRD: CPT | Mod: CPTII,,, | Performed by: STUDENT IN AN ORGANIZED HEALTH CARE EDUCATION/TRAINING PROGRAM

## 2024-05-22 PROCEDURE — 99214 OFFICE O/P EST MOD 30 MIN: CPT | Mod: 25,S$PBB,, | Performed by: STUDENT IN AN ORGANIZED HEALTH CARE EDUCATION/TRAINING PROGRAM

## 2024-05-22 PROCEDURE — 99999 PR PBB SHADOW E&M-EST. PATIENT-LVL IV: CPT | Mod: PBBFAC,,, | Performed by: STUDENT IN AN ORGANIZED HEALTH CARE EDUCATION/TRAINING PROGRAM

## 2024-05-22 PROCEDURE — 20611 DRAIN/INJ JOINT/BURSA W/US: CPT | Mod: 50,PBBFAC | Performed by: STUDENT IN AN ORGANIZED HEALTH CARE EDUCATION/TRAINING PROGRAM

## 2024-05-22 RX ORDER — TRIAMCINOLONE ACETONIDE 40 MG/ML
40 INJECTION, SUSPENSION INTRA-ARTICULAR; INTRAMUSCULAR
Status: DISCONTINUED | OUTPATIENT
Start: 2024-05-22 | End: 2024-05-22 | Stop reason: HOSPADM

## 2024-05-22 RX ADMIN — TRIAMCINOLONE ACETONIDE 40 MG: 200 INJECTION, SUSPENSION INTRA-ARTICULAR; INTRAMUSCULAR at 07:05

## 2024-05-22 NOTE — PATIENT INSTRUCTIONS
Assessment:  Mali Mckinlye is a 39 y.o. female   Chief Complaint   Patient presents with    Right Knee - Pain    Left Knee - Pain       Encounter Diagnoses   Name Primary?    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  Showering is okay today.  We also discussed that blood sugars can be elevated after an injection and asked patient to properly checked her sugars over the next few days and contact their PCP if there are any concerns.  We discussed red flags such as fevers, chills, red, warm, tender joint at the area of injection to please seek medical care immediately.    Prior Authorizations for Gel injections bilateral Euflexxa   Follow up in 4 weeks for gel injections    Follow-up: for gel injections.    Thank you for choosing [facility] and Dr. Higinio Lagos for your orthopedic & sports medicine care. It is our goal to provide you with exceptional care that will help keep you healthy, active, and get you back in the game.    Please do not hesitate to reach out to us via email, phone, or MyChart with any questions, concerns, or feedback.    If you felt that you received exemplary care today, please consider leaving us feedback on [employer] at:  [email or web address]    If you are experiencing pain/discomfort ,or have questions after 5pm and would like to be connected to the [facility] on-call team, please call this number and specify which Sports Medicine provider is treating you: [phone]

## 2024-05-22 NOTE — PROCEDURES
Large Joint Aspiration/Injection: bilateral knee    Date/Time: 5/22/2024 7:40 AM    Performed by: Higinio Lagos MD  Authorized by: Higinio Lagos MD    Consent Done?:  Yes (Verbal)  Indications:  Arthritis and pain  Site marked: the procedure site was marked    Timeout: prior to procedure the correct patient, procedure, and site was verified    Prep: patient was prepped and draped in usual sterile fashion    Local anesthetic:  Bupivacaine 0.5% without epinephrine and lidocaine 1% without epinephrine    Details:  Needle Size:  21 G  Ultrasonic Guidance for needle placement?: Yes    Images are saved and documented.  Approach:  Lateral (superior)  Location:  Knee  Laterality:  Bilateral  Site:  Bilateral knee  Medications (Right):  40 mg triamcinolone acetonide 40 mg/mL  Medications (Left):  40 mg triamcinolone acetonide 40 mg/mL  Patient tolerance:  Patient tolerated the procedure well with no immediate complications     Ultrasound guidance was used for needle localization. Images were saved and stored for documentation. The appropriate structures were visualized. Dynamic visualization of the needle was continuous throughout the procedures and maintained good position.

## 2024-05-22 NOTE — PROGRESS NOTES
Patient ID: Mali Mckinley  YOB: 1985  MRN: [id number]    Chief Complaint: Pain of the Right Knee and Pain of the Left Knee      History of Present Illness: Mali Mckinley is a right-hand dominant 39 y.o. female who presents today with bilateral knee pain.  Last seen in clinic on 12/27/2023 where she received bilateral CSI.  Reports that the injections just started to wear off over the last several weeks.  Patient interested in repeating CSI today and discussing ordering gel injections again.  Last series of gel injections was September of 2023.  Currently rates her pain at a 5/10.     12/27/2023 Interval History of Present Illness: Mali Mckinley is a right-hand dominant 38 y.o. female who presents today with bilateral knee pain.  Patient was last seen in clinic on 9/27/2023 where she received cortisone injections to both knees.  Patient states that both knees are a 7/10 today and that the pain is an achy pain. She did receive the 3 series Euflexxa gel injections in early September.     Interval 8/2/2023 History of Present Illness: Mali Mckinley is a right-hand dominant 38 y.o. female who presents today with bilateral knee pain, right greater than left.  Patient was last seen in clinic on 4/26/2023 when she received bilateral CSI.  She reports that these have seemed to help with her knee pain and she would like to repeat those today.  She rates her pain today at a 4/10.  She received Euflexxa series in February with the last being on 2/23/2023 and states that this seemed to help with pain also and is interested in repeating this series.  She was denied the Iovera procedure by insurance.  She reports when she has pain that she uses heat, which helps with her symptoms.  She has performed physical therapy in the past at [facility].   She is allergic to Toradol, Tramadol and Ibuprofen     The patient is active in none.  Occupation:  Unemployed      Past Medical History:   Past Medical History:   Diagnosis Date    Anxiety     CHF (congestive heart failure)     Fibromyalgia     GERD (gastroesophageal reflux disease)     Hypertension     Neuropathy      Past Surgical History:   Procedure Laterality Date    ARTHROSCOPY OF KNEE Left 12/03/2019    Repair of Torn Meniscus    CHOLECYSTECTOMY      ROTATOR CUFF REPAIR Right 2006 and 2012    ROTATOR CUFF REPAIR Left 2007     Family History   Problem Relation Name Age of Onset    Hypertension Mother      Hypertension Paternal Grandmother      Hyperlipidemia Paternal Grandmother       Social History     Socioeconomic History    Marital status: Single   Occupational History    Occupation: Disabled   Tobacco Use    Smoking status: Never    Smokeless tobacco: Never   Substance and Sexual Activity    Alcohol use: Not Currently    Drug use: Never    Sexual activity: Not Currently     Medication List with Changes/Refills   Current Medications    AMITRIPTYLINE (ELAVIL) 25 MG TABLET    Take 2 tablets (50 mg total) by mouth every evening.    BUMETANIDE (BUMEX) 1 MG TABLET    Take 1 tablet by mouth every morning. PATIENT STATES SHE IS TAKING MEDICATION AS NEEDED    CHLORTHALIDONE (HYGROTEN) 25 MG TAB    Take 1 tablet (25 mg total) by mouth once daily.    DUPILUMAB (DUPIXENT PEN) 300 MG/2 ML PNIJ    every 14 (fourteen) days.    FLUOXETINE 40 MG CAPSULE    Take 1 capsule (40 mg total) by mouth once daily.    FLUTICASONE PROPIONATE (FLONASE) 50 MCG/ACTUATION NASAL SPRAY    100 mcg by Nasal route daily as needed.    GABAPENTIN (NEURONTIN) 300 MG CAPSULE    TAKE 1 CAPSULE BY MOUTHH 3 TIMES DAILY    HYDROCHLOROTHIAZIDE (HYDRODIURIL) 25 MG TABLET    Take 1 tablet (25 mg total) by mouth daily as needed.    HYDROCORTISONE 2.5 % CREAM    APPLY TOPICALLY TO AFFECTED AREA TWICE A DAY FOR 7 DAYS    HYDROXYCHLOROQUINE (PLAQUENIL) 200 MG TABLET    Take 1 tablet (200 mg total) by mouth 2 (two) times daily.    HYDROXYZINE HCL  (ATARAX) 25 MG TABLET    TAKE 1 TABLET BY MOUTH TWICE A DAY    MAGNESIUM GLUCONATE 27.5 MG MAGNE- SIUM (500 MG) TAB    Take 500 mg by mouth once daily.    METHOCARBAMOL (ROBAXIN) 500 MG TAB    TAKE 1 TABLET BY MOUTH TWO TIMES DAILY AS NEEDED FOR MUSCLE SPASMS    NALTREXONE CAPSULE    Take 4.5 mg by mouth once daily.    NORGESTIMATE-ETHINYL ESTRADIOL (ORTHO-CYCLEN) 0.25-35 MG-MCG PER TABLET    Take 1 tablet by mouth once daily.    ONDANSETRON (ZOFRAN-ODT) 4 MG TBDL    Take 1 tablet (4 mg total) by mouth every 6 (six) hours as needed (nausea, alternate with promethazine).    PANTOPRAZOLE (PROTONIX) 40 MG TABLET    TAKE 1 TABLET BY MOUTH 2 TIMES DIALY    POTASSIUM CHLORIDE SA (K-DUR,KLOR-CON) 20 MEQ TABLET    Take 1 tablet (20 mEq total) by mouth once daily. 2 BID    PROMETHAZINE (PHENERGAN) 25 MG TABLET    Take 1 tablet (25 mg total) by mouth every 6 (six) hours as needed for Nausea.    PROPRANOLOL (INDERAL LA) 80 MG 24 HR CAPSULE    Take 1 capsule (80 mg total) by mouth once daily.    TURMERIC ROOT EXTRACT 500 MG CAP    Take 500 mg by mouth 2 (two) times daily.     Review of patient's allergies indicates:   Allergen Reactions    Cyclobenzaprine     Cymbalta [duloxetine]     Ibuprofen     Oxycodone     Sulfa (sulfonamide antibiotics)     Toradol [ketorolac]     Tramadol        Physical Exam:   Body mass index is 37.76 kg/m².    GENERAL: Well appearing, in no acute distress.  HEAD: Normocephalic and atraumatic.  ENT: External ears and nose grossly normal.  EYES: EOMI bilaterally  PULMONARY: Respirations are grossly even and non-labored.  NEURO: Awake, alert, and oriented x 3.  SKIN: No obvious rashes appreciated.  PSYCH: Mood & affect are appropriate.    Detailed MSK exam:     Right knee exam:   -ROM: extension 0, flexion 120  -TTP: Medial joint line and Lateral joint line  -effusion: trace  -Patellar apprehension negative  -Yakelin test negative  -stable to varus and valgus stress tests  -Lachman test negative,  anterior drawer test negative, posterior drawer test negative     Left knee exam:   -ROM: extension 0, flexion 120  -TTP: Medial joint line and lateral joint line  -effusion: trace  -Patellar apprehension negative  -Yakelin test negative  -stable to varus and valgus stress tests  -Lachman test negative, anterior drawer test negative, posterior drawer test negative    Imaging:  Sports Medicine US - Guidance for Needle Placement  Higinio Lagos MD     12/27/2023 10:21 AM  Sports Medicine US - Guidance for Needle Placement    Date/Time: 12/27/2023 7:40 AM    Performed by: Higinio Lagos MD  Authorized by: Higinio Lagos MD  Preparation: Patient was prepped and   draped in the usual sterile fashion.  Local anesthesia used: no    Anesthesia:  Local anesthesia used: no    Sedation:  Patient sedated: no    Patient tolerance: patient tolerated the procedure well with no immediate   complications  Comments: Ultrasound guidance was used for needle localization. Images   were saved and stored for documentation. The appropriate structures were   visualized. Dynamic visualization of the needle was continuous throughout   the procedures and maintained good position.         Relevant imaging results were reviewed and interpreted by me and per my read shows chondrosis.  This was discussed with the patient and / or family today.     Assessment:  Mali Mckinley is a 39 y.o. female following up for bilateral knee pain. Steroid injection lasted until a few days ago and is interested in repeating them again today as well as doing gel injections again which helped too.   Plan: Steroid injection given today (see separate procedure note for details). We discussed the proper protocols after the injection such as no submerging pools, baths tubs, or hot tubs for 24 hr.  Showering is okay today.  We also discussed that blood sugars can be elevated after an injection and asked patient to properly checked her sugars over  the next few days and contact their PCP if there are any concerns.  We discussed red flags such as fevers, chills, red, warm, tender joint at the area of injection to please seek medical care immediately.   Prior auth for gel injections. Continue conservative management for pain.   Follow up for gel injections. All questions answered.     Patellofemoral pain syndrome of both knees  -     Sports Medicine US - Guidance for Needle Placement  -     Prior authorization Order  -     Large Joint Aspiration/Injection: bilateral knee    Chronic pain of both knees  -     Sports Medicine US - Guidance for Needle Placement  -     Prior authorization Order  -     Large Joint Aspiration/Injection: bilateral knee    Chondral defect of condyle of right femur  -     Sports Medicine US - Guidance for Needle Placement  -     Prior authorization Order  -     Large Joint Aspiration/Injection: bilateral knee         Ultrasound guidance was used for needle localization. Images were saved and stored for documentation. The appropriate structures were visualized. Dynamic visualization of the needle was continuous throughout the procedures and maintained good position.      MEDICAL NECESSITY FOR VISCOSUPPLEMENTATION: After thorough evaluation of the patient, I have determined that visco-supplementation is medically necessary. The patient has painful degenerative changes of the knee with failure of conservative treatments including lifestyle modifications and rehabilitation exercises.  Oral analgesis/NSAIDs have not adequately controlled symptoms and there is radiographic evidence of Kellgren Raimundo grade 2 or greater osteoarthritic changes, or in lack of radiographic evidence, there is arthroscopic or other evidence of chondrosis.       Electronically signed:  Higinio Lagos MD, MPH  05/22/2024  8:08 AM

## 2024-05-22 NOTE — PROCEDURES
Sports Medicine US - Guidance for Needle Placement    Date/Time: 5/22/2024 7:40 AM    Performed by: Higinio Lagos MD  Authorized by: Higinio Lagos MD  Preparation: Patient was prepped and draped in the usual sterile fashion.  Local anesthesia used: no    Anesthesia:  Local anesthesia used: no    Sedation:  Patient sedated: no    Patient tolerance: patient tolerated the procedure well with no immediate complications  Comments: Ultrasound guidance was used for needle localization. Images were saved and stored for documentation. The appropriate structures were visualized. Dynamic visualization of the needle was continuous throughout the procedures and maintained good position.

## 2024-06-10 ENCOUNTER — TELEPHONE (OUTPATIENT)
Dept: SPORTS MEDICINE | Facility: CLINIC | Age: 39
End: 2024-06-10
Payer: MEDICAID

## 2024-08-21 ENCOUNTER — OFFICE VISIT (OUTPATIENT)
Dept: SPORTS MEDICINE | Facility: CLINIC | Age: 39
End: 2024-08-21
Payer: MEDICAID

## 2024-08-21 VITALS — BODY MASS INDEX: 37.9 KG/M2 | HEIGHT: 64 IN | WEIGHT: 222 LBS

## 2024-08-21 DIAGNOSIS — M22.2X1 PATELLOFEMORAL PAIN SYNDROME OF BOTH KNEES: ICD-10-CM

## 2024-08-21 DIAGNOSIS — G89.29 CHRONIC PAIN OF BOTH KNEES: Primary | ICD-10-CM

## 2024-08-21 DIAGNOSIS — M25.562 CHRONIC PAIN OF BOTH KNEES: Primary | ICD-10-CM

## 2024-08-21 DIAGNOSIS — M22.2X2 PATELLOFEMORAL PAIN SYNDROME OF BOTH KNEES: ICD-10-CM

## 2024-08-21 DIAGNOSIS — M25.561 CHRONIC PAIN OF BOTH KNEES: Primary | ICD-10-CM

## 2024-08-21 PROCEDURE — 99214 OFFICE O/P EST MOD 30 MIN: CPT | Mod: PBBFAC,25 | Performed by: STUDENT IN AN ORGANIZED HEALTH CARE EDUCATION/TRAINING PROGRAM

## 2024-08-21 PROCEDURE — 99999 PR PBB SHADOW E&M-EST. PATIENT-LVL IV: CPT | Mod: PBBFAC,,, | Performed by: STUDENT IN AN ORGANIZED HEALTH CARE EDUCATION/TRAINING PROGRAM

## 2024-08-21 PROCEDURE — 99999PBSHW PR PBB SHADOW TECHNICAL ONLY FILED TO HB: Mod: PBBFAC,,,

## 2024-08-21 PROCEDURE — 20611 DRAIN/INJ JOINT/BURSA W/US: CPT | Mod: 50,PBBFAC | Performed by: STUDENT IN AN ORGANIZED HEALTH CARE EDUCATION/TRAINING PROGRAM

## 2024-08-21 RX ORDER — TRIAMCINOLONE ACETONIDE 40 MG/ML
40 INJECTION, SUSPENSION INTRA-ARTICULAR; INTRAMUSCULAR
Status: DISCONTINUED | OUTPATIENT
Start: 2024-08-21 | End: 2024-08-21 | Stop reason: HOSPADM

## 2024-08-21 RX ADMIN — TRIAMCINOLONE ACETONIDE 40 MG: 200 INJECTION, SUSPENSION INTRA-ARTICULAR; INTRAMUSCULAR at 07:08

## 2024-08-21 NOTE — PROCEDURES
Sports Medicine US - Guidance for Needle Placement    Date/Time: 8/21/2024 7:40 AM    Performed by: Higinio Lagos MD  Authorized by: Higinio Lagos MD  Preparation: Patient was prepped and draped in the usual sterile fashion.  Local anesthesia used: no    Anesthesia:  Local anesthesia used: no    Sedation:  Patient sedated: no    Patient tolerance: patient tolerated the procedure well with no immediate complications  Comments: Ultrasound guidance was used for needle localization. Images were saved and stored for documentation. The appropriate structures were visualized. Dynamic visualization of the needle was continuous throughout the procedures and maintained good position.

## 2024-08-21 NOTE — PROGRESS NOTES
Patient ID: Mali Mckinley  YOB: 1985  MRN: [id number]    Chief Complaint: Pain of the Left Knee and Pain of the Right Knee      History of Present Illness: Mali Mckinley is a right-hand dominant 39 y.o. female who presents today with bilateral knee pain.  Patient was last seen in clinic on 5/22/2024 and received bilateral CSIs.  Patient was scheduled for gel injections but they were denied by insurance.  States her knees have been feeling good and believes the injections are helping.  Rates her pain today at a 2/10.  Would like to repeat CSIs today.     5/22/2024 Interval History of Present Illness: Mali Mckinley is a right-hand dominant 39 y.o. female who presents today with bilateral knee pain.  Last seen in clinic on 12/27/2023 where she received bilateral CSI.  Reports that the injections just started to wear off over the last several weeks.  Patient interested in repeating CSI today and discussing ordering gel injections again.  Last series of gel injections was September of 2023.  Currently rates her pain at a 5/10.     12/27/2023 Interval History of Present Illness: Mali Mckinley is a right-hand dominant 38 y.o. female who presents today with bilateral knee pain.  Patient was last seen in clinic on 9/27/2023 where she received cortisone injections to both knees.  Patient states that both knees are a 7/10 today and that the pain is an achy pain. She did receive the 3 series Euflexxa gel injections in early September.     Interval 8/2/2023 History of Present Illness: Mali Mckinley is a right-hand dominant 38 y.o. female who presents today with bilateral knee pain, right greater than left.  Patient was last seen in clinic on 4/26/2023 when she received bilateral CSI.  She reports that these have seemed to help with her knee pain and she would like to repeat those today.  She rates her pain today at a 4/10.  She received Euflexxa series in  February with the last being on 2/23/2023 and states that this seemed to help with pain also and is interested in repeating this series.  She was denied the Iovera procedure by insurance.  She reports when she has pain that she uses heat, which helps with her symptoms.  She has performed physical therapy in the past at [facility] ([facility]   She is allergic to Toradol, Tramadol and Ibuprofen        The patient is active in none.  Occupation: Unemployed      Past Medical History:   Past Medical History:   Diagnosis Date    Anxiety     CHF (congestive heart failure)     Fibromyalgia     GERD (gastroesophageal reflux disease)     Hypertension     Neuropathy      Past Surgical History:   Procedure Laterality Date    ARTHROSCOPY OF KNEE Left 12/03/2019    Repair of Torn Meniscus    CHOLECYSTECTOMY      ROTATOR CUFF REPAIR Right 2006 and 2012    ROTATOR CUFF REPAIR Left 2007     Family History   Problem Relation Name Age of Onset    Hypertension Mother      Hypertension Paternal Grandmother      Hyperlipidemia Paternal Grandmother       Social History     Socioeconomic History    Marital status: Single   Occupational History    Occupation: Disabled   Tobacco Use    Smoking status: Never    Smokeless tobacco: Never   Substance and Sexual Activity    Alcohol use: Not Currently    Drug use: Never    Sexual activity: Not Currently     Social Determinants of Health     Financial Resource Strain: Patient Declined (7/27/2024)    Overall Financial Resource Strain (CARDIA)     Difficulty of Paying Living Expenses: Patient declined   Food Insecurity: No Food Insecurity (7/27/2024)    Hunger Vital Sign     Worried About Running Out of Food in the Last Year: Never true     Ran Out of Food in the Last Year: Never true   Physical Activity: Insufficiently Active (7/27/2024)    Exercise Vital Sign     Days of Exercise per Week: 2 days     Minutes of Exercise per Session: 60 min   Stress: Patient Declined (7/27/2024)     [employer] [place] [employer] - Occupational Stress Questionnaire     Feeling of Stress : Patient declined   Housing Stability: Unknown (7/27/2024)    Housing Stability Vital Sign     Unable to Pay for Housing in the Last Year: No     Medication List with Changes/Refills   Current Medications    AMITRIPTYLINE (ELAVIL) 25 MG TABLET    Take 2 tablets (50 mg total) by mouth every evening.    CHLORTHALIDONE (HYGROTEN) 25 MG TAB    Take 1 tablet (25 mg total) by mouth once daily.    DUPILUMAB (DUPIXENT PEN) 300 MG/2 ML PNIJ    every 14 (fourteen) days.    FLUOXETINE 40 MG CAPSULE    Take 1 capsule (40 mg total) by mouth once daily.    FLUTICASONE PROPIONATE (FLONASE) 50 MCG/ACTUATION NASAL SPRAY    100 mcg by Nasal route daily as needed.    GABAPENTIN (NEURONTIN) 300 MG CAPSULE    TAKE 1 CAPSULE BY MOUTHH 3 TIMES DAILY    HYDROCHLOROTHIAZIDE (HYDRODIURIL) 25 MG TABLET    Take 1 tablet (25 mg total) by mouth daily as needed.    HYDROCODONE-ACETAMINOPHEN (NORCO)  MG PER TABLET    Take 1 tablet by mouth every 6 (six) hours as needed for Pain.    HYDROXYCHLOROQUINE (PLAQUENIL) 200 MG TABLET    Take 1 tablet by mouth 2 (two) times daily.    HYDROXYZINE HCL (ATARAX) 25 MG TABLET    TAKE 1 TABLET BY MOUTH TWICE A DAY    MAGNESIUM GLUCONATE 27.5 MG MAGNE- SIUM (500 MG) TAB    Take 500 mg by mouth once daily.    METHOCARBAMOL (ROBAXIN) 500 MG TAB    TAKE 1 TABLET BY MOUTH TWO TIMES DAILY AS NEEDED FOR MUSCLE SPASMS    NALTREXONE 4.5 MG CAP    Take 1 capsule by mouth every evening.    NORGESTIMATE-ETHINYL ESTRADIOL (ORTHO-CYCLEN) 0.25-35 MG-MCG PER TABLET    Take 1 tablet by mouth once daily.    ONDANSETRON (ZOFRAN-ODT) 4 MG TBDL    Take 1 tablet (4 mg total) by mouth every 6 (six) hours as needed (nausea, alternate with promethazine).    PANTOPRAZOLE (PROTONIX) 40 MG TABLET    TAKE 1 TABLET BY MOUTH 2 TIMES DIALY    POTASSIUM CHLORIDE SA (K-DUR,KLOR-CON) 20 MEQ TABLET    Take 1 tablet (20 mEq total) by mouth 2  (two) times daily.    PROMETHAZINE (PHENERGAN) 25 MG TABLET    Take 1 tablet (25 mg total) by mouth every 6 (six) hours as needed for Nausea.    PROPRANOLOL (INDERAL LA) 80 MG 24 HR CAPSULE    Take 1 capsule (80 mg total) by mouth once daily.    TURMERIC ROOT EXTRACT 500 MG CAP    Take 500 mg by mouth 2 (two) times daily.     Review of patient's allergies indicates:   Allergen Reactions    Cyclobenzaprine     Cymbalta [duloxetine]     Ibuprofen     Oxycodone     Sulfa (sulfonamide antibiotics)     Toradol [ketorolac]     Tramadol        Physical Exam:   Body mass index is 38.11 kg/m².    GENERAL: Well appearing, in no acute distress.  HEAD: Normocephalic and atraumatic.  ENT: External ears and nose grossly normal.  EYES: EOMI bilaterally  PULMONARY: Respirations are grossly even and non-labored.  NEURO: Awake, alert, and oriented x 3.  SKIN: No obvious rashes appreciated.  PSYCH: Mood & affect are appropriate.    Detailed MSK exam:     Right knee exam:   -ROM: extension 0, flexion 120  -TTP: Medial joint line and Lateral joint line  -effusion: trace  -Patellar apprehension negative  -Yakelin test negative  -stable to varus and valgus stress tests  -Lachman test negative, anterior drawer test negative, posterior drawer test negative     Left knee exam:   -ROM: extension 0, flexion 120  -TTP: Medial joint line and lateral joint line  -effusion: trace  -Patellar apprehension negative  -Yakelin test negative  -stable to varus and valgus stress tests  -Lachman test negative, anterior drawer test negative, posterior drawer test negative    Imaging:  Sports Medicine US - Guidance for Needle Placement  Higinio Lagos MD     5/22/2024  8:41 AM  Sports Medicine US - Guidance for Needle Placement    Date/Time: 5/22/2024 7:40 AM    Performed by: Higinio Lagos MD  Authorized by: Hiignio Lagos MD  Preparation: Patient was prepped and   draped in the usual sterile fashion.  Local anesthesia used:  no    Anesthesia:  Local anesthesia used: no    Sedation:  Patient sedated: no    Patient tolerance: patient tolerated the procedure well with no immediate   complications  Comments: Ultrasound guidance was used for needle localization. Images   were saved and stored for documentation. The appropriate structures were   visualized. Dynamic visualization of the needle was continuous throughout   the procedures and maintained good position.         Relevant imaging results were reviewed and interpreted by me and per my read shows chondrosis.  This was discussed with the patient and / or family today.     Assessment:  Mali Mckinley is a 39 y.o. female following up for bilateral knee pain. Interested in repeat steroid injections today. Gel injections were denied by insurance.   Plan: Steroid injection given today (see separate procedure note for details). We discussed the proper protocols after the injection such as no submerging pools, baths tubs, or hot tubs for 24 hr.  Showering is okay today.  We also discussed that blood sugars can be elevated after an injection and asked patient to properly checked her sugars over the next few days and contact their PCP if there are any concerns.  We discussed red flags such as fevers, chills, red, warm, tender joint at the area of injection to please seek medical care immediately.   Continue conservative management for pain.   Follow up as needed. All questions answered.     Chronic pain of both knees  -     Sports Medicine US - Guidance for Needle Placement  -     Large Joint Aspiration/Injection: bilateral knee    Patellofemoral pain syndrome of both knees  -     Sports Medicine US - Guidance for Needle Placement  -     Large Joint Aspiration/Injection: bilateral knee         Ultrasound guidance was used for needle localization. Images were saved and stored for documentation. The appropriate structures were visualized. Dynamic visualization of the needle was continuous  throughout the procedures and maintained good position.      Electronically signed:  Higinio Lagos MD, MPH  08/21/2024  7:58 AM

## 2024-08-21 NOTE — PROCEDURES
Large Joint Aspiration/Injection: bilateral knee    Date/Time: 8/21/2024 7:40 AM    Performed by: Higinio Lagos MD  Authorized by: Higinio Lagos MD    Consent Done?:  Yes (Verbal)  Indications:  Arthritis and pain  Site marked: the procedure site was marked    Timeout: prior to procedure the correct patient, procedure, and site was verified    Prep: patient was prepped and draped in usual sterile fashion    Local anesthetic:  Bupivacaine 0.5% without epinephrine and lidocaine 1% without epinephrine    Details:  Needle Size:  21 G  Ultrasonic Guidance for needle placement?: Yes    Images are saved and documented.  Approach:  Lateral (superior)  Location:  Knee  Laterality:  Bilateral  Site:  Bilateral knee  Medications (Right):  40 mg triamcinolone acetonide 40 mg/mL  Medications (Left):  40 mg triamcinolone acetonide 40 mg/mL  Patient tolerance:  Patient tolerated the procedure well with no immediate complications     Ultrasound guidance was used for needle localization. Images were saved and stored for documentation. The appropriate structures were visualized. Dynamic visualization of the needle was continuous throughout the procedures and maintained good position.

## 2024-08-21 NOTE — PATIENT INSTRUCTIONS
Assessment:  Mali Mckinley is a 39 y.o. female   Chief Complaint   Patient presents with    Left Knee - Pain    Right Knee - Pain       Encounter Diagnoses   Name Primary?    Chronic pain of both knees Yes    Patellofemoral pain syndrome of both knees         Plan:  Ultrasound guided cortisone injections to bilateral knees  We discussed the proper protocols after the injection such as no submerging pools, baths tubs, or hot tubs for 24 hr.  Showering is okay today.  We also discussed that blood sugars can be elevated after an injection and asked patient to properly checked her sugars over the next few days and contact their PCP if there are any concerns.  We discussed red flags such as fevers, chills, red, warm, tender joint at the area of injection to please seek medical care immediately.    Follow up as needed    Follow-up: as needed.    Thank you for choosing [facility] and Dr. Higinio Lagos for your orthopedic & sports medicine care. It is our goal to provide you with exceptional care that will help keep you healthy, active, and get you back in the game.    Please do not hesitate to reach out to us via email, phone, or MyChart with any questions, concerns, or feedback.    If you felt that you received exemplary care today, please consider leaving us feedback on [employer] at:  [email or web address]    If you are experiencing pain/discomfort ,or have questions after 5pm and would like to be connected to the [facility] on-call team, please call this number and specify which Sports Medicine provider is treating you: [phone]

## 2024-12-20 PROBLEM — M79.7 FIBROMYALGIA: Status: ACTIVE | Noted: 2024-10-04

## 2025-01-15 ENCOUNTER — OFFICE VISIT (OUTPATIENT)
Dept: SPORTS MEDICINE | Facility: CLINIC | Age: 40
End: 2025-01-15
Payer: MEDICAID

## 2025-01-15 VITALS — HEIGHT: 64 IN | BODY MASS INDEX: 37.73 KG/M2 | WEIGHT: 221 LBS

## 2025-01-15 DIAGNOSIS — M25.561 CHRONIC PAIN OF BOTH KNEES: ICD-10-CM

## 2025-01-15 DIAGNOSIS — M22.2X2 PATELLOFEMORAL PAIN SYNDROME OF BOTH KNEES: Primary | ICD-10-CM

## 2025-01-15 DIAGNOSIS — G89.29 CHRONIC PAIN OF BOTH KNEES: ICD-10-CM

## 2025-01-15 DIAGNOSIS — M25.562 CHRONIC PAIN OF BOTH KNEES: ICD-10-CM

## 2025-01-15 DIAGNOSIS — M22.2X1 PATELLOFEMORAL PAIN SYNDROME OF BOTH KNEES: Primary | ICD-10-CM

## 2025-01-15 PROCEDURE — 99214 OFFICE O/P EST MOD 30 MIN: CPT | Mod: 25,S$PBB,, | Performed by: STUDENT IN AN ORGANIZED HEALTH CARE EDUCATION/TRAINING PROGRAM

## 2025-01-15 PROCEDURE — 99999 PR PBB SHADOW E&M-EST. PATIENT-LVL IV: CPT | Mod: PBBFAC,,, | Performed by: STUDENT IN AN ORGANIZED HEALTH CARE EDUCATION/TRAINING PROGRAM

## 2025-01-15 PROCEDURE — 3008F BODY MASS INDEX DOCD: CPT | Mod: CPTII,,, | Performed by: STUDENT IN AN ORGANIZED HEALTH CARE EDUCATION/TRAINING PROGRAM

## 2025-01-15 PROCEDURE — 20611 DRAIN/INJ JOINT/BURSA W/US: CPT | Mod: 50,PBBFAC | Performed by: STUDENT IN AN ORGANIZED HEALTH CARE EDUCATION/TRAINING PROGRAM

## 2025-01-15 PROCEDURE — 1159F MED LIST DOCD IN RCRD: CPT | Mod: CPTII,,, | Performed by: STUDENT IN AN ORGANIZED HEALTH CARE EDUCATION/TRAINING PROGRAM

## 2025-01-15 PROCEDURE — 99214 OFFICE O/P EST MOD 30 MIN: CPT | Mod: PBBFAC,25 | Performed by: STUDENT IN AN ORGANIZED HEALTH CARE EDUCATION/TRAINING PROGRAM

## 2025-01-15 PROCEDURE — 99999PBSHW PR PBB SHADOW TECHNICAL ONLY FILED TO HB: Mod: PBBFAC,,,

## 2025-01-15 PROCEDURE — 1160F RVW MEDS BY RX/DR IN RCRD: CPT | Mod: CPTII,,, | Performed by: STUDENT IN AN ORGANIZED HEALTH CARE EDUCATION/TRAINING PROGRAM

## 2025-01-15 RX ORDER — TRIAMCINOLONE ACETONIDE 40 MG/ML
40 INJECTION, SUSPENSION INTRA-ARTICULAR; INTRAMUSCULAR
Status: DISCONTINUED | OUTPATIENT
Start: 2025-01-15 | End: 2025-01-15 | Stop reason: HOSPADM

## 2025-01-15 RX ADMIN — TRIAMCINOLONE ACETONIDE 40 MG: 200 INJECTION, SUSPENSION INTRA-ARTICULAR; INTRAMUSCULAR at 07:01

## 2025-01-15 NOTE — PROGRESS NOTES
Patient ID: Mali Mckinley  YOB: 1985  MRN: [id number]    Chief Complaint: Pain of the Left Knee and Pain of the Right Knee      History of Present Illness: Mali Mckinley is a 39 y.o. female who presents today with bilateral knee pain.  Last seen in clinic on 8/21/2024 and received bilateral cortisone injections.  Patient was denied gel injections and has been able to tolerate pain until now.  Rates her pain at a 4/10.  No reports of falls, giving way or locking since last visit. Interested in repeating CSI today to both knees.    8/21/2024 Interval History of Present Illness: Mali Mckinley is a right-hand dominant 39 y.o. female who presents today with bilateral knee pain.  Patient was last seen in clinic on 5/22/2024 and received bilateral CSIs.  Patient was scheduled for gel injections but they were denied by insurance.  States her knees have been feeling good and believes the injections are helping.  Rates her pain today at a 2/10.  Would like to repeat CSIs today.     5/22/2024 Interval History of Present Illness: Mali Mckinley is a right-hand dominant 39 y.o. female who presents today with bilateral knee pain.  Last seen in clinic on 12/27/2023 where she received bilateral CSI.  Reports that the injections just started to wear off over the last several weeks.  Patient interested in repeating CSI today and discussing ordering gel injections again.  Last series of gel injections was September of 2023.  Currently rates her pain at a 5/10.     12/27/2023 Interval History of Present Illness: Mali Mckinley is a right-hand dominant 38 y.o. female who presents today with bilateral knee pain.  Patient was last seen in clinic on 9/27/2023 where she received cortisone injections to both knees.  Patient states that both knees are a 7/10 today and that the pain is an achy pain. She did receive the 3 series Euflexxa gel injections in early September.      Interval 8/2/2023 History of Present Illness: Mali Mckinley is a right-hand dominant 38 y.o. female who presents today with bilateral knee pain, right greater than left.  Patient was last seen in clinic on 4/26/2023 when she received bilateral CSI.  She reports that these have seemed to help with her knee pain and she would like to repeat those today.  She rates her pain today at a 4/10.  She received Euflexxa series in February with the last being on 2/23/2023 and states that this seemed to help with pain also and is interested in repeating this series.  She was denied the Iovera procedure by insurance.  She reports when she has pain that she uses heat, which helps with her symptoms.  She has performed physical therapy in the past at [facility].   She is allergic to Toradol, Tramadol and Ibuprofen          The patient is active in none.  Occupation: Unemployed      Past Medical History:   Past Medical History:   Diagnosis Date    Anxiety     CHF (congestive heart failure)     Fibromyalgia     GERD (gastroesophageal reflux disease)     Hypertension     Neuropathy      Past Surgical History:   Procedure Laterality Date    ARTHROSCOPY OF KNEE Left 12/03/2019    Repair of Torn Meniscus    CHOLECYSTECTOMY      ROTATOR CUFF REPAIR Right 2006 and 2012    ROTATOR CUFF REPAIR Left 2007     Family History   Problem Relation Name Age of Onset    Hypertension Mother      Hypertension Paternal Grandmother      Hyperlipidemia Paternal Grandmother       Social History     Socioeconomic History    Marital status: Single   Occupational History    Occupation: Disabled   Tobacco Use    Smoking status: Never    Smokeless tobacco: Never   Substance and Sexual Activity    Alcohol use: Not Currently    Drug use: Never    Sexual activity: Not Currently     Social Drivers of Health     Financial Resource Strain: Patient Declined (7/27/2024)    Overall Financial Resource Strain (CARDIA)     Difficulty of  Paying Living Expenses: Patient declined   Food Insecurity: No Food Insecurity (7/27/2024)    Hunger Vital Sign     Worried About Running Out of Food in the Last Year: Never true     Ran Out of Food in the Last Year: Never true   Physical Activity: Insufficiently Active (7/27/2024)    Exercise Vital Sign     Days of Exercise per Week: 2 days     Minutes of Exercise per Session: 60 min   Stress: Patient Declined (7/27/2024)    [employer] - Occupational Stress Questionnaire     Feeling of Stress : Patient declined   Housing Stability: Unknown (7/27/2024)    Housing Stability Vital Sign     Unable to Pay for Housing in the Last Year: No     Medication List with Changes/Refills   Current Medications    AMITRIPTYLINE (ELAVIL) 25 MG TABLET    TAKE 2 TABLETS BY MOUTH BY MOUTH IN THE EVENING    CHLORTHALIDONE (HYGROTEN) 25 MG TAB    TAKE 1 TABLET BY MOUTH DAILY    DUPILUMAB (DUPIXENT PEN) 300 MG/2 ML PNIJ    every 14 (fourteen) days.    FLECAINIDE (TAMBOCOR) 50 MG TAB    Take 50 mg by mouth.    FLUOXETINE 40 MG CAPSULE    TAKE 1 CAPSULE BY MOUTH DAILY    GABAPENTIN (NEURONTIN) 300 MG CAPSULE    TAKE 1 CAPSULE BY MOUTH 3 TIMES DAILY    HYDROCHLOROTHIAZIDE (HYDRODIURIL) 25 MG TABLET    Take 1 tablet (25 mg total) by mouth daily as needed.    HYDROCODONE-ACETAMINOPHEN (NORCO)  MG PER TABLET    Take 1 tablet by mouth every 6 (six) hours as needed for Pain.    HYDROCORTISONE 2.5 % CREAM    Apply topically 2 (two) times daily. for 7 days    HYDROXYCHLOROQUINE (PLAQUENIL) 200 MG TABLET    Take 1 tablet by mouth 2 (two) times daily.    HYDROXYZINE HCL (ATARAX) 25 MG TABLET    TAKE 1 TABLET BY MOUTH TWICE A DAY    MAGNESIUM GLUCONATE 27.5 MG MAGNE- SIUM (500 MG) TAB    Take 500 mg by mouth once daily.    METHOCARBAMOL (ROBAXIN) 500 MG TAB    TAKE 1 TABLET BY MOUTH TWO TIMES DAILY AS NEEDED FOR MUSCLE SPASMS    METOPROLOL TARTRATE (LOPRESSOR) 50 MG TABLET    TAKE ONE TABLET BY MOUTH 4 HOURS PRIOR  TO CT AND ONE TABLET 1 HOUR PRIOR TO CT    NALTREXONE 4.5 MG CAP    Take 1 capsule by mouth every evening.    NORGESTIMATE-ETHINYL ESTRADIOL (ORTHO-CYCLEN) 0.25-35 MG-MCG PER TABLET    Take 1 tablet by mouth once daily.    PANTOPRAZOLE (PROTONIX) 40 MG TABLET    TAKE 1 TABLET BY MOUTH 2 TIMES DAILY.    POTASSIUM CHLORIDE SA (K-DUR,KLOR-CON) 20 MEQ TABLET    Take 1 tablet (20 mEq total) by mouth 2 (two) times daily.    PROMETHAZINE (PHENERGAN) 25 MG TABLET    Take 1 tablet (25 mg total) by mouth every 6 (six) hours as needed for Nausea.    PROMETHAZINE-DEXTROMETHORPHAN (PROMETHAZINE-DM) 6.25-15 MG/5 ML SYRP    Take 5 mLs by mouth every 6 (six) hours as needed (cough).    PROPRANOLOL (INDERAL LA) 80 MG 24 HR CAPSULE    Take 1 capsule (80 mg total) by mouth once daily.    TRIAMCINOLONE ACETONIDE 0.1% (KENALOG) 0.1 % CREAM    APPLY TOPICALLY TWICE A DAY AS NEEDED    TURMERIC ROOT EXTRACT 500 MG CAP    Take 500 mg by mouth 2 (two) times daily.     Review of patient's allergies indicates:   Allergen Reactions    Cyclobenzaprine     Cymbalta [duloxetine]     Ibuprofen     Oxycodone     Sulfa (sulfonamide antibiotics)     Toradol [ketorolac]     Tramadol        Physical Exam:   Body mass index is 37.93 kg/m².    GENERAL: Well appearing, in no acute distress.  HEAD: Normocephalic and atraumatic.  ENT: External ears and nose grossly normal.  EYES: EOMI bilaterally  PULMONARY: Respirations are grossly even and non-labored.  NEURO: Awake, alert, and oriented x 3.  SKIN: No obvious rashes appreciated.  PSYCH: Mood & affect are appropriate.    Detailed MSK exam:     Right knee exam:   -ROM: extension 0, flexion 120  -TTP: Medial joint line and Lateral joint line  -effusion: trace  -Patellar apprehension negative  -Yakelin test negative  -stable to varus and valgus stress tests  -Lachman test negative, anterior drawer test negative, posterior drawer test negative     Left knee exam:   -ROM: extension 0, flexion 120  -TTP: Medial  joint line and lateral joint line  -effusion: trace  -Patellar apprehension negative  -Yakelin test negative  -stable to varus and valgus stress tests  -Lachman test negative, anterior drawer test negative, posterior drawer test negative    Imaging:  Sports Medicine US - Guidance for Needle Placement  Higinio Lagos MD     8/21/2024  8:29 AM  Sports Medicine US - Guidance for Needle Placement    Date/Time: 8/21/2024 7:40 AM    Performed by: Higinio Lagos MD  Authorized by: Higinio Lagos MD  Preparation: Patient was prepped and   draped in the usual sterile fashion.  Local anesthesia used: no    Anesthesia:  Local anesthesia used: no    Sedation:  Patient sedated: no    Patient tolerance: patient tolerated the procedure well with no immediate   complications  Comments: Ultrasound guidance was used for needle localization. Images   were saved and stored for documentation. The appropriate structures were   visualized. Dynamic visualization of the needle was continuous throughout   the procedures and maintained good position.         Relevant imaging results were reviewed and interpreted by me and per my read shows chondrosis.  This was discussed with the patient and / or family today.     Assessment:  Mali Mckinley is a 39 y.o. female following up for bilateral knee pain. Interested in repeat injections today.   Plan: Steroid injection given today (see separate procedure note for details). We discussed the proper protocols after the injection such as no submerging pools, baths tubs, or hot tubs for 24 hr.  Showering is okay today.  We also discussed that blood sugars can be elevated after an injection and asked patient to properly checked her sugars over the next few days and contact their PCP if there are any concerns.  We discussed red flags such as fevers, chills, red, warm, tender joint at the area of injection to please seek medical care immediately.   Continue conservative management.    Follow up as needed. All questions answered.     Patellofemoral pain syndrome of both knees  -     Sports Medicine US - Guidance for Needle Placement  -     Large Joint Aspiration/Injection: bilateral knee    Chronic pain of both knees  -     Sports Medicine US - Guidance for Needle Placement  -     Large Joint Aspiration/Injection: bilateral knee         Ultrasound guidance was used for needle localization. Images were saved and stored for documentation. The appropriate structures were visualized. Dynamic visualization of the needle was continuous throughout the procedures and maintained good position.      MEDICAL NECESSITY FOR VISCOSUPPLEMENTATION: After thorough evaluation of the patient, I have determined that visco-supplementation is medically necessary. The patient has painful degenerative changes of the knee with failure of conservative treatments including lifestyle modifications and rehabilitation exercises.  Oral analgesis/NSAIDs have not adequately controlled symptoms and there is radiographic evidence of Kellgren Raimundo grade 2 or greater osteoarthritic changes, or in lack of radiographic evidence, there is arthroscopic or other evidence of chondrosis.     Electronically signed:  Higinio Lagos MD, MPH  01/15/2025  9:08 AM

## 2025-01-15 NOTE — PATIENT INSTRUCTIONS
Assessment:  Mali Mckinley is a 39 y.o. female   Chief Complaint   Patient presents with    Left Knee - Pain    Right Knee - Pain       Encounter Diagnoses   Name Primary?    Patellofemoral pain syndrome of both knees Yes    Chronic pain of both knees         Plan:  Ultrasound guided cortisone injections to bilateral knees  We discussed the proper protocols after the injection such as no submerging pools, baths tubs, or hot tubs for 24 hr.  Showering is okay today.  We also discussed that blood sugars can be elevated after an injection and asked patient to properly checked her sugars over the next few days and contact their PCP if there are any concerns.  We discussed red flags such as fevers, chills, red, warm, tender joint at the area of injection to please seek medical care immediately.    Follow up as needed    Follow-up: as needed.    Thank you for choosing [facility] and Dr. Higinio Lagos for your orthopedic & sports medicine care. It is our goal to provide you with exceptional care that will help keep you healthy, active, and get you back in the game.    Please do not hesitate to reach out to us via email, phone, or MyChart with any questions, concerns, or feedback.    If you felt that you received exemplary care today, please consider leaving us feedback on [employer] at:  [email or web address]    If you are experiencing pain/discomfort ,or have questions after 5pm and would like to be connected to the [facility] on-call team, please call this number and specify which Sports Medicine provider is treating you: [phone]

## 2025-01-15 NOTE — PROCEDURES
Sports Medicine US - Guidance for Needle Placement    Date/Time: 1/15/2025 7:40 AM    Performed by: Higinio Lagos MD  Authorized by: Higinio Lagos MD  Preparation: Patient was prepped and draped in the usual sterile fashion.  Local anesthesia used: no    Anesthesia:  Local anesthesia used: no    Sedation:  Patient sedated: no    Patient tolerance: patient tolerated the procedure well with no immediate complications  Comments: Ultrasound guidance was used for needle localization. Images were saved and stored for documentation. The appropriate structures were visualized. Dynamic visualization of the needle was continuous throughout the procedures and maintained good position.

## 2025-01-15 NOTE — PROCEDURES
Large Joint Aspiration/Injection: bilateral knee    Date/Time: 1/15/2025 7:40 AM    Performed by: Higinio Lagos MD  Authorized by: Higinio Lagos MD    Consent Done?:  Yes (Verbal)  Indications:  Arthritis and pain  Site marked: the procedure site was marked    Timeout: prior to procedure the correct patient, procedure, and site was verified    Prep: patient was prepped and draped in usual sterile fashion    Local anesthetic:  Bupivacaine 0.5% without epinephrine and lidocaine 1% without epinephrine    Details:  Needle Size:  21 G  Ultrasonic Guidance for needle placement?: Yes    Images are saved and documented.  Approach:  Lateral (superior)  Location:  Knee  Laterality:  Bilateral  Site:  Bilateral knee  Medications (Right):  40 mg triamcinolone acetonide 40 mg/mL  Medications (Left):  40 mg triamcinolone acetonide 40 mg/mL  Patient tolerance:  Patient tolerated the procedure well with no immediate complications     Ultrasound guidance was used for needle localization. Images were saved and stored for documentation. The appropriate structures were visualized. Dynamic visualization of the needle was continuous throughout the procedures and maintained good position.

## 2025-07-03 DIAGNOSIS — M25.561 CHRONIC PAIN OF BOTH KNEES: Primary | ICD-10-CM

## 2025-07-03 DIAGNOSIS — M25.562 CHRONIC PAIN OF BOTH KNEES: Primary | ICD-10-CM

## 2025-07-03 DIAGNOSIS — G89.29 CHRONIC PAIN OF BOTH KNEES: Primary | ICD-10-CM

## 2025-07-03 NOTE — PROGRESS NOTES
Patient ID: Mali Mckinley  YOB: 1985  MRN: [id number]    Chief Complaint: No chief complaint on file.      Referred By: No ref. provider found     History of Present Illness: Mali Mckinley is a  40 y.o. female   Data Unavailable with a chief complaint of No chief complaint on file.    Presents today with bilateral knee pain. Rates pain at a 3/10. Pain has been present for several years. Patient has been previously treated by Dr. Higinio Lagos and was last seen on 1/15/2025. Patient received bilateral cortisone injections at this visit. Had been receiving gel injections but insurance denied at the last request.  She reports no injury or trauma since previous but has had gradual return of her knee pain        HPI    Past Medical History:   Past Medical History:   Diagnosis Date    Anxiety     CHF (congestive heart failure)     Fibromyalgia     GERD (gastroesophageal reflux disease)     Hypertension     Neuropathy      Past Surgical History:   Procedure Laterality Date    ARTHROSCOPY OF KNEE Left 12/03/2019    Repair of Torn Meniscus    CHOLECYSTECTOMY      ROTATOR CUFF REPAIR Right 2006 and 2012    ROTATOR CUFF REPAIR Left 2007     Family History   Problem Relation Name Age of Onset    Hypertension Mother      Hypertension Paternal Grandmother      Hyperlipidemia Paternal Grandmother       Social History[1]  Medication List with Changes/Refills   Current Medications    AMITRIPTYLINE (ELAVIL) 25 MG TABLET    TAKE 2 TABLETS BY MOUTH BY MOUTH IN THE EVENING    CHLORTHALIDONE (HYGROTEN) 25 MG TAB    TAKE 1 TABLET BY MOUTH DAILY    DUPILUMAB (DUPIXENT PEN) 300 MG/2 ML PNIJ    every 14 (fourteen) days.    FLECAINIDE (TAMBOCOR) 50 MG TAB    Take 50 mg by mouth.    FLUOXETINE 40 MG CAPSULE    TAKE 1 CAPSULE BY MOUTH DAILY    GABAPENTIN (NEURONTIN) 300 MG CAPSULE    TAKE 1 CAPSULE BY MOUTH 3 TIMES DAILY    HYDROCHLOROTHIAZIDE (HYDRODIURIL) 25 MG TABLET    Take 1 tablet (25 mg total) by  mouth daily as needed.    HYDROCODONE-ACETAMINOPHEN (NORCO)  MG PER TABLET    Take 1 tablet by mouth every 6 (six) hours as needed for Pain.    HYDROCORTISONE 2.5 % CREAM    Apply topically 2 (two) times daily. for 7 days    HYDROXYCHLOROQUINE (PLAQUENIL) 200 MG TABLET    Take 1 tablet by mouth 2 (two) times daily.    HYDROXYZINE HCL (ATARAX) 25 MG TABLET    TAKE 1 TABLET BY MOUTH TWICE A DAY    MAGNESIUM GLUCONATE 27.5 MG MAGNE- SIUM (500 MG) TAB    Take 500 mg by mouth once daily.    METHOCARBAMOL (ROBAXIN) 500 MG TAB    TAKE 1 TABLET BY MOUTH TWO TIMES DAILY AS NEEDED FOR MUSCLE SPASMS    METOPROLOL TARTRATE (LOPRESSOR) 50 MG TABLET    TAKE ONE TABLET BY MOUTH 4 HOURS PRIOR TO CT AND ONE TABLET 1 HOUR PRIOR TO CT    NALTREXONE 4.5 MG CAP    Take 1 capsule by mouth every evening.    NORGESTIMATE-ETHINYL ESTRADIOL (ORTHO-CYCLEN) 0.25-35 MG-MCG PER TABLET    Take 1 tablet by mouth once daily.    PANTOPRAZOLE (PROTONIX) 40 MG TABLET    TAKE 1 TABLET BY MOUTH 2 TIMES DAILY.    PROMETHAZINE-DEXTROMETHORPHAN (PROMETHAZINE-DM) 6.25-15 MG/5 ML SYRP    Take 5 mLs by mouth every 6 (six) hours as needed (cough).    PROPRANOLOL (INDERAL LA) 80 MG 24 HR CAPSULE    Take 1 capsule (80 mg total) by mouth once daily.    TRIAMCINOLONE ACETONIDE 0.1% (KENALOG) 0.1 % CREAM    APPLY TOPICALLY TWICE A DAY AS NEEDED    TURMERIC ROOT EXTRACT 500 MG CAP    Take 500 mg by mouth 2 (two) times daily.     Review of patient's allergies indicates:   Allergen Reactions    Cyclobenzaprine     Cymbalta [duloxetine]     Ibuprofen     Oxycodone     Sulfa (sulfonamide antibiotics)     Toradol [ketorolac]     Tramadol      Review of Systems   Constitutional: Negative for chills and decreased appetite.   HENT:  Negative for ear pain.    Eyes:  Negative for blurred vision.   Cardiovascular:  Negative for chest pain and claudication.   Respiratory:  Negative for hemoptysis.    Endocrine: Negative for cold intolerance.   Hematologic/Lymphatic: Does  not bruise/bleed easily.   Skin:  Negative for dry skin.   Musculoskeletal:  Positive for joint pain.   Gastrointestinal:  Negative for abdominal pain.   Genitourinary:  Negative for flank pain.   Neurological:  Negative for brief paralysis.   Psychiatric/Behavioral:  Negative for depression.    Allergic/Immunologic: Negative for hives.       Physical Exam:   There is no height or weight on file to calculate BMI.  There were no vitals filed for this visit.   GENERAL: Well appearing, appropriate for stated age, no acute distress.  CARDIOVASCULAR: Pulses regular by peripheral palpation.  PULMONARY: Respirations are even and non-labored.  NEURO: Awake, alert, and oriented x 3.  PSYCH: Mood & affect are appropriate.  HEENT: Head is normocephalic and atraumatic.  Ortho/SPM Exam  Skin is intact about the knees no signs of erythema or infection  Range of motion of the knees is well-maintained  Logroll of the hips is benign  Straight leg raise is negative neurovascular exam of the extremities is intact  She has no significant knee effusion today  He has tenderness along the joint line both medial and lateral bilaterally  She has no meniscal signs today  Portals in the left knee are well healed    Imaging:    Sports Medicine US - Guidance for Needle Placement  Higinio Lagos MD     1/15/2025  9:37 AM  Sports Medicine US - Guidance for Needle Placement    Date/Time: 1/15/2025 7:40 AM    Performed by: Higinio Lagos MD  Authorized by: Higinio Lagos MD  Preparation: Patient was prepped and   draped in the usual sterile fashion.  Local anesthesia used: no    Anesthesia:  Local anesthesia used: no    Sedation:  Patient sedated: no    Patient tolerance: patient tolerated the procedure well with no immediate   complications  Comments: Ultrasound guidance was used for needle localization. Images   were saved and stored for documentation. The appropriate structures were   visualized. Dynamic visualization of the needle  was continuous throughout   the procedures and maintained good position.       Relevant imaging results reviewed and interpreted by me, discussed with the patient and / or family today.  Knee x-rays today show good maintenance of the joint space with no significant osteophytes or degenerative changes    Other Tests:     None    There are no Patient Instructions on file for this visit.  Provider Note/Medical Decision Making:     Assessment: Mali Mckinley is a 40 y.o. female presenting with bilateral knee pain.  History, physical and radiographs are consistent with a likely diagnosis of bilateral knee pain chronic.  Plan:  She does well with periodic corticosteroid injections so we gave those to her today.  I answered all of her and her mother's questions at length.   Follow up:  As needed. All questions answered  I discussed worrisome and red flag signs and symptoms with the patient. The patient expressed understanding and agreed to alert me immediately or to go to the emergency room if they experience any of these.   Treatment plan was developed with input from the patient/family, and they expressed understanding and agreement with the plan. All questions were answered today.         Alexis Garcia MD  Orthopaedic Surgery       Disclaimer: This note was prepared using a voice recognition system and is likely to have sound alike errors within the text.          [1]   Social History  Socioeconomic History    Marital status: Single   Occupational History    Occupation: Disabled   Tobacco Use    Smoking status: Never    Smokeless tobacco: Never   Substance and Sexual Activity    Alcohol use: Not Currently    Drug use: Never    Sexual activity: Not Currently     Social Drivers of Health     Financial Resource Strain: Patient Declined (7/27/2024)    Overall Financial Resource Strain (CARDIA)     Difficulty of Paying Living Expenses: Patient declined   Food Insecurity: No Food Insecurity (7/27/2024)     Hunger Vital Sign     Worried About Running Out of Food in the Last Year: Never true     Ran Out of Food in the Last Year: Never true   Physical Activity: Insufficiently Active (7/27/2024)    Exercise Vital Sign     Days of Exercise per Week: 2 days     Minutes of Exercise per Session: 60 min   Stress: Patient Declined (7/27/2024)    [employer] [place] [employer] - Occupational Stress Questionnaire     Feeling of Stress : Patient declined   Housing Stability: Unknown (7/27/2024)    Housing Stability Vital Sign     Unable to Pay for Housing in the Last Year: No

## 2025-07-09 ENCOUNTER — HOSPITAL ENCOUNTER (OUTPATIENT)
Dept: RADIOLOGY | Facility: HOSPITAL | Age: 40
Discharge: HOME | End: 2025-07-09
Attending: ORTHOPAEDIC SURGERY
Payer: MEDICAID

## 2025-07-09 ENCOUNTER — OFFICE VISIT (OUTPATIENT)
Dept: ORTHOPEDICS | Facility: CLINIC | Age: 40
End: 2025-07-09
Payer: MEDICAID

## 2025-07-09 VITALS
SYSTOLIC BLOOD PRESSURE: 135 MMHG | BODY MASS INDEX: 37.39 KG/M2 | HEIGHT: 64 IN | WEIGHT: 219 LBS | DIASTOLIC BLOOD PRESSURE: 71 MMHG | HEART RATE: 69 BPM

## 2025-07-09 DIAGNOSIS — G89.29 CHRONIC PAIN OF BOTH KNEES: ICD-10-CM

## 2025-07-09 DIAGNOSIS — M25.561 CHRONIC PAIN OF BOTH KNEES: ICD-10-CM

## 2025-07-09 DIAGNOSIS — M25.562 CHRONIC PAIN OF BOTH KNEES: ICD-10-CM

## 2025-07-09 DIAGNOSIS — M25.562 CHRONIC PAIN OF BOTH KNEES: Primary | ICD-10-CM

## 2025-07-09 DIAGNOSIS — M25.561 CHRONIC PAIN OF BOTH KNEES: Primary | ICD-10-CM

## 2025-07-09 DIAGNOSIS — G89.29 CHRONIC PAIN OF BOTH KNEES: Primary | ICD-10-CM

## 2025-07-09 PROCEDURE — 99999PBSHW PR PBB SHADOW TECHNICAL ONLY FILED TO HB: Mod: PBBFAC,,,

## 2025-07-09 PROCEDURE — 73564 X-RAY EXAM KNEE 4 OR MORE: CPT | Mod: TC,50

## 2025-07-09 PROCEDURE — 20610 DRAIN/INJ JOINT/BURSA W/O US: CPT | Mod: 50,PBBFAC | Performed by: ORTHOPAEDIC SURGERY

## 2025-07-09 PROCEDURE — 99214 OFFICE O/P EST MOD 30 MIN: CPT | Mod: PBBFAC,25 | Performed by: ORTHOPAEDIC SURGERY

## 2025-07-09 PROCEDURE — 73564 X-RAY EXAM KNEE 4 OR MORE: CPT | Mod: 26,50,, | Performed by: RADIOLOGY

## 2025-07-09 PROCEDURE — 99999 PR PBB SHADOW E&M-EST. PATIENT-LVL IV: CPT | Mod: PBBFAC,,, | Performed by: ORTHOPAEDIC SURGERY

## 2025-07-09 RX ORDER — TRIAMCINOLONE ACETONIDE 40 MG/ML
60 INJECTION, SUSPENSION INTRA-ARTICULAR; INTRAMUSCULAR
Status: DISCONTINUED | OUTPATIENT
Start: 2025-07-09 | End: 2025-07-09 | Stop reason: HOSPADM

## 2025-07-09 RX ORDER — LIDOCAINE HYDROCHLORIDE 10 MG/ML
0.5 INJECTION, SOLUTION INFILTRATION; PERINEURAL
Status: DISCONTINUED | OUTPATIENT
Start: 2025-07-09 | End: 2025-07-09 | Stop reason: HOSPADM

## 2025-07-09 RX ORDER — LIDOCAINE HYDROCHLORIDE 10 MG/ML
3.5 INJECTION, SOLUTION INFILTRATION; PERINEURAL
Status: DISCONTINUED | OUTPATIENT
Start: 2025-07-09 | End: 2025-07-09 | Stop reason: HOSPADM

## 2025-07-09 RX ADMIN — TRIAMCINOLONE ACETONIDE 60 MG: 200 INJECTION, SUSPENSION INTRA-ARTICULAR; INTRAMUSCULAR at 09:07

## 2025-07-09 RX ADMIN — LIDOCAINE HYDROCHLORIDE 0.5 ML: 10 INJECTION, SOLUTION INFILTRATION; PERINEURAL at 09:07

## 2025-07-09 RX ADMIN — LIDOCAINE HYDROCHLORIDE 3.5 ML: 10 INJECTION, SOLUTION INFILTRATION; PERINEURAL at 09:07

## 2025-07-09 NOTE — PROCEDURES
Large Joint Aspiration/Injection: bilateral knee    Date/Time: 7/9/2025 9:15 AM    Performed by: Alexis Garcia Jr., MD  Authorized by: Alexis Garcia Jr., MD    Consent Done?:  Yes (Verbal)  Indications:  Pain  Timeout: prior to procedure the correct patient, procedure, and site was verified    Local anesthesia used?: No      Details:  Needle Size:  21 G  Ultrasonic Guidance for needle placement?: No    Approach:  Anterolateral  Location:  Knee  Laterality:  Bilateral  Site:  Bilateral knee  Medications (Right):  3.5 mL LIDOcaine HCL 10 mg/ml (1%) 10 mg/mL (1 %); 60 mg triamcinolone acetonide 40 mg/mL  Medications (Left):  0.5 mL LIDOcaine HCL 10 mg/ml (1%) 10 mg/mL (1 %); 60 mg triamcinolone acetonide 40 mg/mL  Patient tolerance:  Patient tolerated the procedure well with no immediate complications

## 2025-07-09 NOTE — PATIENT INSTRUCTIONS
Assessment:  Mali Mckinley is a 40 y.o. female   Chief Complaint   Patient presents with    Right Knee - Pain    Left Knee - Pain       No diagnosis found.     Plan:  Reviewed imaging with patient today.  Cortisone injection to bilateral knees  Follow up as needed    Follow-up: as needed or sooner if there are any problems between now and then.    Thank you for choosing [facility] and Dr. Alexis Garcia for your orthopedic care. It is our goal to provide you with exceptional care that will help keep you healthy, active, and get you back in the game.    Please do not hesitate to reach out to us via email, phone, or MyChart with any questions, concerns, or feedback.     If you are experiencing pain/discomfort ,or have questions and would like to be connected to the [facility] on-call team, please call this number and specify which Orthopedic / Sports Medicine provider is treating you: [phone]